# Patient Record
Sex: FEMALE | Race: BLACK OR AFRICAN AMERICAN | NOT HISPANIC OR LATINO | Employment: OTHER | URBAN - METROPOLITAN AREA
[De-identification: names, ages, dates, MRNs, and addresses within clinical notes are randomized per-mention and may not be internally consistent; named-entity substitution may affect disease eponyms.]

---

## 2017-09-25 ENCOUNTER — CONVERSION ENCOUNTER (OUTPATIENT)
Dept: MAMMOGRAPHY | Facility: HOSPITAL | Age: 82
End: 2017-09-25

## 2018-01-18 ENCOUNTER — OFFICE VISIT CONVERTED (OUTPATIENT)
Dept: ONCOLOGY | Facility: HOSPITAL | Age: 83
End: 2018-01-18
Attending: INTERNAL MEDICINE

## 2018-01-25 ENCOUNTER — OFFICE VISIT CONVERTED (OUTPATIENT)
Dept: SURGERY | Facility: CLINIC | Age: 83
End: 2018-01-25
Attending: PHYSICIAN ASSISTANT

## 2018-02-12 ENCOUNTER — OFFICE VISIT CONVERTED (OUTPATIENT)
Dept: FAMILY MEDICINE CLINIC | Facility: CLINIC | Age: 83
End: 2018-02-12
Attending: FAMILY MEDICINE

## 2018-04-25 ENCOUNTER — CONVERSION ENCOUNTER (OUTPATIENT)
Dept: ORTHOPEDIC SURGERY | Facility: CLINIC | Age: 83
End: 2018-04-25

## 2018-04-25 ENCOUNTER — OFFICE VISIT CONVERTED (OUTPATIENT)
Dept: ORTHOPEDIC SURGERY | Facility: CLINIC | Age: 83
End: 2018-04-25
Attending: PHYSICIAN ASSISTANT

## 2018-05-10 ENCOUNTER — CONVERSION ENCOUNTER (OUTPATIENT)
Dept: FAMILY MEDICINE CLINIC | Facility: CLINIC | Age: 83
End: 2018-05-10

## 2018-05-10 ENCOUNTER — OFFICE VISIT CONVERTED (OUTPATIENT)
Dept: FAMILY MEDICINE CLINIC | Facility: CLINIC | Age: 83
End: 2018-05-10
Attending: FAMILY MEDICINE

## 2018-05-18 ENCOUNTER — OFFICE VISIT CONVERTED (OUTPATIENT)
Dept: ONCOLOGY | Facility: HOSPITAL | Age: 83
End: 2018-05-18
Attending: INTERNAL MEDICINE

## 2018-08-01 ENCOUNTER — OFFICE VISIT CONVERTED (OUTPATIENT)
Dept: ORTHOPEDIC SURGERY | Facility: CLINIC | Age: 83
End: 2018-08-01
Attending: PHYSICIAN ASSISTANT

## 2018-08-16 ENCOUNTER — CONVERSION ENCOUNTER (OUTPATIENT)
Dept: FAMILY MEDICINE CLINIC | Facility: CLINIC | Age: 83
End: 2018-08-16

## 2018-08-16 ENCOUNTER — OFFICE VISIT CONVERTED (OUTPATIENT)
Dept: FAMILY MEDICINE CLINIC | Facility: CLINIC | Age: 83
End: 2018-08-16
Attending: FAMILY MEDICINE

## 2018-08-17 ENCOUNTER — OFFICE VISIT CONVERTED (OUTPATIENT)
Dept: ONCOLOGY | Facility: HOSPITAL | Age: 83
End: 2018-08-17
Attending: INTERNAL MEDICINE

## 2018-10-29 ENCOUNTER — CONVERSION ENCOUNTER (OUTPATIENT)
Dept: FAMILY MEDICINE CLINIC | Facility: CLINIC | Age: 83
End: 2018-10-29

## 2018-10-29 ENCOUNTER — OFFICE VISIT CONVERTED (OUTPATIENT)
Dept: FAMILY MEDICINE CLINIC | Facility: CLINIC | Age: 83
End: 2018-10-29
Attending: FAMILY MEDICINE

## 2018-11-07 ENCOUNTER — OFFICE VISIT CONVERTED (OUTPATIENT)
Dept: FAMILY MEDICINE CLINIC | Facility: CLINIC | Age: 83
End: 2018-11-07
Attending: FAMILY MEDICINE

## 2019-01-17 ENCOUNTER — CONVERSION ENCOUNTER (OUTPATIENT)
Dept: FAMILY MEDICINE CLINIC | Facility: CLINIC | Age: 84
End: 2019-01-17

## 2019-01-17 ENCOUNTER — OFFICE VISIT CONVERTED (OUTPATIENT)
Dept: FAMILY MEDICINE CLINIC | Facility: CLINIC | Age: 84
End: 2019-01-17
Attending: FAMILY MEDICINE

## 2019-01-17 ENCOUNTER — HOSPITAL ENCOUNTER (OUTPATIENT)
Dept: FAMILY MEDICINE CLINIC | Facility: CLINIC | Age: 84
Discharge: HOME | End: 2019-01-17
Attending: FAMILY MEDICINE

## 2019-01-17 LAB
ALBUMIN SERPL-MCNC: 4 G/DL (ref 3.5–5)
ALBUMIN/GLOB SERPL: 1 {RATIO} (ref 1.4–2.6)
ALP SERPL-CCNC: 84 U/L (ref 38–185)
ALT SERPL-CCNC: 10 U/L (ref 10–40)
ANION GAP SERPL CALC-SCNC: 20 MMOL/L (ref 8–19)
AST SERPL-CCNC: 25 U/L (ref 15–50)
BILIRUB SERPL-MCNC: 0.45 MG/DL (ref 0.2–1.3)
BUN SERPL-MCNC: 15 MG/DL (ref 5–25)
BUN/CREAT SERPL: 13 {RATIO} (ref 6–20)
CALCIUM SERPL-MCNC: 10.1 MG/DL (ref 8.7–10.4)
CHLORIDE SERPL-SCNC: 104 MMOL/L (ref 99–111)
CONV CO2: 23 MMOL/L (ref 22–32)
CONV TOTAL PROTEIN: 8.2 G/DL (ref 6.3–8.2)
CREAT UR-MCNC: 1.14 MG/DL (ref 0.5–0.9)
GFR SERPLBLD BASED ON 1.73 SQ M-ARVRAT: 48 ML/MIN/{1.73_M2}
GLOBULIN UR ELPH-MCNC: 4.2 G/DL (ref 2–3.5)
GLUCOSE SERPL-MCNC: 86 MG/DL (ref 65–99)
OSMOLALITY SERPL CALC.SUM OF ELEC: 294 MOSM/KG (ref 273–304)
POTASSIUM SERPL-SCNC: 4.9 MMOL/L (ref 3.5–5.3)
SODIUM SERPL-SCNC: 142 MMOL/L (ref 135–147)
TROPONIN T SERPL-MCNC: <0.01 NG/ML (ref 0–0.1)
TSH SERPL-ACNC: 0.55 M[IU]/L (ref 0.27–4.2)

## 2019-01-19 LAB — BACTERIA UR CULT: NORMAL

## 2019-01-28 ENCOUNTER — HOSPITAL ENCOUNTER (OUTPATIENT)
Dept: OTHER | Facility: HOSPITAL | Age: 84
Discharge: HOME | End: 2019-01-28
Attending: FAMILY MEDICINE

## 2019-02-08 ENCOUNTER — OFFICE VISIT CONVERTED (OUTPATIENT)
Dept: ORTHOPEDIC SURGERY | Facility: CLINIC | Age: 84
End: 2019-02-08
Attending: ORTHOPAEDIC SURGERY

## 2019-02-28 ENCOUNTER — CONVERSION ENCOUNTER (OUTPATIENT)
Dept: FAMILY MEDICINE CLINIC | Facility: CLINIC | Age: 84
End: 2019-02-28

## 2019-02-28 ENCOUNTER — OFFICE VISIT CONVERTED (OUTPATIENT)
Dept: FAMILY MEDICINE CLINIC | Facility: CLINIC | Age: 84
End: 2019-02-28
Attending: FAMILY MEDICINE

## 2019-02-28 ENCOUNTER — HOSPITAL ENCOUNTER (OUTPATIENT)
Dept: FAMILY MEDICINE CLINIC | Facility: CLINIC | Age: 84
Discharge: HOME | End: 2019-02-28
Attending: FAMILY MEDICINE

## 2019-02-28 LAB
ALBUMIN SERPL-MCNC: 4 G/DL (ref 3.5–5)
ALBUMIN/GLOB SERPL: 1 {RATIO} (ref 1.4–2.6)
ALP SERPL-CCNC: 92 U/L (ref 38–185)
ALT SERPL-CCNC: 16 U/L (ref 10–40)
ANION GAP SERPL CALC-SCNC: 18 MMOL/L (ref 8–19)
AST SERPL-CCNC: 19 U/L (ref 15–50)
BILIRUB SERPL-MCNC: 0.52 MG/DL (ref 0.2–1.3)
BUN SERPL-MCNC: 17 MG/DL (ref 5–25)
BUN/CREAT SERPL: 15 {RATIO} (ref 6–20)
CALCIUM SERPL-MCNC: 9.8 MG/DL (ref 8.7–10.4)
CHLORIDE SERPL-SCNC: 105 MMOL/L (ref 99–111)
CHOLEST SERPL-MCNC: 168 MG/DL (ref 107–200)
CHOLEST/HDLC SERPL: 2.2 {RATIO} (ref 3–6)
CONV CO2: 23 MMOL/L (ref 22–32)
CONV TOTAL PROTEIN: 7.9 G/DL (ref 6.3–8.2)
CREAT UR-MCNC: 1.13 MG/DL (ref 0.5–0.9)
GFR SERPLBLD BASED ON 1.73 SQ M-ARVRAT: 48 ML/MIN/{1.73_M2}
GLOBULIN UR ELPH-MCNC: 3.9 G/DL (ref 2–3.5)
GLUCOSE SERPL-MCNC: 76 MG/DL (ref 65–99)
HDLC SERPL-MCNC: 78 MG/DL (ref 40–60)
LDLC SERPL CALC-MCNC: 74 MG/DL (ref 70–100)
OSMOLALITY SERPL CALC.SUM OF ELEC: 294 MOSM/KG (ref 273–304)
POTASSIUM SERPL-SCNC: 3.8 MMOL/L (ref 3.5–5.3)
SODIUM SERPL-SCNC: 142 MMOL/L (ref 135–147)
TRIGL SERPL-MCNC: 79 MG/DL (ref 40–150)
VLDLC SERPL-MCNC: 16 MG/DL (ref 5–37)

## 2019-05-16 ENCOUNTER — OFFICE VISIT CONVERTED (OUTPATIENT)
Dept: FAMILY MEDICINE CLINIC | Facility: CLINIC | Age: 84
End: 2019-05-16
Attending: FAMILY MEDICINE

## 2019-05-16 ENCOUNTER — HOSPITAL ENCOUNTER (OUTPATIENT)
Dept: FAMILY MEDICINE CLINIC | Facility: CLINIC | Age: 84
Discharge: HOME | End: 2019-05-16
Attending: FAMILY MEDICINE

## 2019-05-16 LAB
ALBUMIN SERPL-MCNC: 4.2 G/DL (ref 3.5–5)
ALBUMIN/GLOB SERPL: 1.2 {RATIO} (ref 1.4–2.6)
ALP SERPL-CCNC: 81 U/L (ref 38–185)
ALT SERPL-CCNC: 8 U/L (ref 10–40)
ANION GAP SERPL CALC-SCNC: 16 MMOL/L (ref 8–19)
AST SERPL-CCNC: 17 U/L (ref 15–50)
BILIRUB SERPL-MCNC: 0.4 MG/DL (ref 0.2–1.3)
BUN SERPL-MCNC: 12 MG/DL (ref 5–25)
BUN/CREAT SERPL: 14 {RATIO} (ref 6–20)
CALCIUM SERPL-MCNC: 9.7 MG/DL (ref 8.7–10.4)
CHLORIDE SERPL-SCNC: 104 MMOL/L (ref 99–111)
CONV CO2: 24 MMOL/L (ref 22–32)
CONV TOTAL PROTEIN: 7.6 G/DL (ref 6.3–8.2)
CREAT UR-MCNC: 0.88 MG/DL (ref 0.5–0.9)
GFR SERPLBLD BASED ON 1.73 SQ M-ARVRAT: >60 ML/MIN/{1.73_M2}
GLOBULIN UR ELPH-MCNC: 3.4 G/DL (ref 2–3.5)
GLUCOSE SERPL-MCNC: 91 MG/DL (ref 65–99)
OSMOLALITY SERPL CALC.SUM OF ELEC: 291 MOSM/KG (ref 273–304)
POTASSIUM SERPL-SCNC: 3.3 MMOL/L (ref 3.5–5.3)
SODIUM SERPL-SCNC: 141 MMOL/L (ref 135–147)

## 2019-05-29 ENCOUNTER — HOSPITAL ENCOUNTER (OUTPATIENT)
Dept: CARDIOLOGY | Facility: HOSPITAL | Age: 84
Discharge: HOME | End: 2019-05-29
Attending: FAMILY MEDICINE

## 2019-06-10 ENCOUNTER — OFFICE VISIT CONVERTED (OUTPATIENT)
Dept: FAMILY MEDICINE CLINIC | Facility: CLINIC | Age: 84
End: 2019-06-10
Attending: FAMILY MEDICINE

## 2019-07-23 ENCOUNTER — HOSPITAL ENCOUNTER (OUTPATIENT)
Dept: NEUROLOGY | Facility: HOSPITAL | Age: 84
Discharge: HOME | End: 2019-07-23
Attending: PSYCHIATRY & NEUROLOGY

## 2019-09-24 ENCOUNTER — HOSPITAL ENCOUNTER (OUTPATIENT)
Dept: FAMILY MEDICINE CLINIC | Facility: CLINIC | Age: 84
Discharge: HOME | End: 2019-09-24
Attending: FAMILY MEDICINE

## 2019-09-24 ENCOUNTER — OFFICE VISIT CONVERTED (OUTPATIENT)
Dept: FAMILY MEDICINE CLINIC | Facility: CLINIC | Age: 84
End: 2019-09-24
Attending: FAMILY MEDICINE

## 2019-09-24 LAB
ALBUMIN SERPL-MCNC: 4.4 G/DL (ref 3.5–5)
ALBUMIN/GLOB SERPL: 1.2 {RATIO} (ref 1.4–2.6)
ALP SERPL-CCNC: 90 U/L (ref 38–185)
ALT SERPL-CCNC: 7 U/L (ref 10–40)
ANION GAP SERPL CALC-SCNC: 22 MMOL/L (ref 8–19)
AST SERPL-CCNC: 16 U/L (ref 15–50)
BASOPHILS # BLD AUTO: 0.03 10*3/UL (ref 0–0.2)
BASOPHILS NFR BLD AUTO: 0.5 % (ref 0–3)
BILIRUB SERPL-MCNC: 0.37 MG/DL (ref 0.2–1.3)
BUN SERPL-MCNC: 17 MG/DL (ref 5–25)
BUN/CREAT SERPL: 14 {RATIO} (ref 6–20)
CALCIUM SERPL-MCNC: 10.2 MG/DL (ref 8.7–10.4)
CHLORIDE SERPL-SCNC: 103 MMOL/L (ref 99–111)
CHOLEST SERPL-MCNC: 207 MG/DL (ref 107–200)
CHOLEST/HDLC SERPL: 3.4 {RATIO} (ref 3–6)
CONV ABS IMM GRAN: 0.01 10*3/UL (ref 0–0.2)
CONV CO2: 21 MMOL/L (ref 22–32)
CONV IMMATURE GRAN: 0.2 % (ref 0–1.8)
CONV TOTAL PROTEIN: 8.2 G/DL (ref 6.3–8.2)
CREAT UR-MCNC: 1.21 MG/DL (ref 0.5–0.9)
DEPRECATED RDW RBC AUTO: 48.2 FL (ref 36.4–46.3)
EOSINOPHIL # BLD AUTO: 0.09 10*3/UL (ref 0–0.7)
EOSINOPHIL # BLD AUTO: 1.6 % (ref 0–7)
ERYTHROCYTE [DISTWIDTH] IN BLOOD BY AUTOMATED COUNT: 13.4 % (ref 11.7–14.4)
GFR SERPLBLD BASED ON 1.73 SQ M-ARVRAT: 44 ML/MIN/{1.73_M2}
GLOBULIN UR ELPH-MCNC: 3.8 G/DL (ref 2–3.5)
GLUCOSE SERPL-MCNC: 83 MG/DL (ref 65–99)
HCT VFR BLD AUTO: 35.6 % (ref 37–47)
HDLC SERPL-MCNC: 61 MG/DL (ref 40–60)
HGB BLD-MCNC: 10.9 G/DL (ref 12–16)
LDLC SERPL CALC-MCNC: 122 MG/DL (ref 70–100)
LYMPHOCYTES # BLD AUTO: 2.25 10*3/UL (ref 1–5)
LYMPHOCYTES NFR BLD AUTO: 40 % (ref 20–45)
MCH RBC QN AUTO: 29.9 PG (ref 27–31)
MCHC RBC AUTO-ENTMCNC: 30.6 G/DL (ref 33–37)
MCV RBC AUTO: 97.5 FL (ref 81–99)
MONOCYTES # BLD AUTO: 0.59 10*3/UL (ref 0.2–1.2)
MONOCYTES NFR BLD AUTO: 10.5 % (ref 3–10)
NEUTROPHILS # BLD AUTO: 2.66 10*3/UL (ref 2–8)
NEUTROPHILS NFR BLD AUTO: 47.2 % (ref 30–85)
NRBC CBCN: 0 % (ref 0–0.7)
OSMOLALITY SERPL CALC.SUM OF ELEC: 295 MOSM/KG (ref 273–304)
PLATELET # BLD AUTO: 189 10*3/UL (ref 130–400)
PMV BLD AUTO: 12.1 FL (ref 9.4–12.3)
POTASSIUM SERPL-SCNC: 4.4 MMOL/L (ref 3.5–5.3)
RBC # BLD AUTO: 3.65 10*6/UL (ref 4.2–5.4)
SODIUM SERPL-SCNC: 142 MMOL/L (ref 135–147)
T4 FREE SERPL-MCNC: 1 NG/DL (ref 0.9–1.8)
TRIGL SERPL-MCNC: 119 MG/DL (ref 40–150)
TSH SERPL-ACNC: 8.01 M[IU]/L (ref 0.27–4.2)
VLDLC SERPL-MCNC: 24 MG/DL (ref 5–37)
WBC # BLD AUTO: 5.63 10*3/UL (ref 4.8–10.8)

## 2019-10-16 ENCOUNTER — OFFICE VISIT CONVERTED (OUTPATIENT)
Dept: ORTHOPEDIC SURGERY | Facility: CLINIC | Age: 84
End: 2019-10-16
Attending: ORTHOPAEDIC SURGERY

## 2019-12-12 ENCOUNTER — CONVERSION ENCOUNTER (OUTPATIENT)
Dept: FAMILY MEDICINE CLINIC | Facility: CLINIC | Age: 84
End: 2019-12-12

## 2019-12-12 ENCOUNTER — OFFICE VISIT CONVERTED (OUTPATIENT)
Dept: FAMILY MEDICINE CLINIC | Facility: CLINIC | Age: 84
End: 2019-12-12
Attending: FAMILY MEDICINE

## 2019-12-12 ENCOUNTER — HOSPITAL ENCOUNTER (OUTPATIENT)
Dept: FAMILY MEDICINE CLINIC | Facility: CLINIC | Age: 84
Discharge: HOME | End: 2019-12-12
Attending: FAMILY MEDICINE

## 2019-12-12 LAB
BASOPHILS # BLD AUTO: 0.03 10*3/UL (ref 0–0.2)
BASOPHILS NFR BLD AUTO: 0.5 % (ref 0–3)
CONV ABS IMM GRAN: 0.02 10*3/UL (ref 0–0.2)
CONV IMMATURE GRAN: 0.3 % (ref 0–1.8)
DEPRECATED RDW RBC AUTO: 48.6 FL (ref 36.4–46.3)
EOSINOPHIL # BLD AUTO: 0.05 10*3/UL (ref 0–0.7)
EOSINOPHIL # BLD AUTO: 0.9 % (ref 0–7)
ERYTHROCYTE [DISTWIDTH] IN BLOOD BY AUTOMATED COUNT: 13.4 % (ref 11.7–14.4)
FOLATE SERPL-MCNC: 15.8 NG/ML (ref 4.8–20)
HCT VFR BLD AUTO: 34 % (ref 37–47)
HGB BLD-MCNC: 10.5 G/DL (ref 12–16)
IRON SATN MFR SERPL: 17 % (ref 20–55)
IRON SERPL-MCNC: 53 UG/DL (ref 60–170)
LYMPHOCYTES # BLD AUTO: 1.61 10*3/UL (ref 1–5)
LYMPHOCYTES NFR BLD AUTO: 27.4 % (ref 20–45)
MCH RBC QN AUTO: 30.6 PG (ref 27–31)
MCHC RBC AUTO-ENTMCNC: 30.9 G/DL (ref 33–37)
MCV RBC AUTO: 99.1 FL (ref 81–99)
MONOCYTES # BLD AUTO: 0.59 10*3/UL (ref 0.2–1.2)
MONOCYTES NFR BLD AUTO: 10.1 % (ref 3–10)
NEUTROPHILS # BLD AUTO: 3.57 10*3/UL (ref 2–8)
NEUTROPHILS NFR BLD AUTO: 60.8 % (ref 30–85)
NRBC CBCN: 0 % (ref 0–0.7)
PLATELET # BLD AUTO: 199 10*3/UL (ref 130–400)
PMV BLD AUTO: 12 FL (ref 9.4–12.3)
RBC # BLD AUTO: 3.43 10*6/UL (ref 4.2–5.4)
TIBC SERPL-MCNC: 307 UG/DL (ref 245–450)
TRANSFERRIN SERPL-MCNC: 215 MG/DL (ref 250–380)
VIT B12 SERPL-MCNC: 539 PG/ML (ref 211–911)
WBC # BLD AUTO: 5.87 10*3/UL (ref 4.8–10.8)

## 2019-12-13 LAB — BACTERIA UR CULT: NORMAL

## 2020-01-29 ENCOUNTER — OFFICE VISIT CONVERTED (OUTPATIENT)
Dept: ORTHOPEDIC SURGERY | Facility: CLINIC | Age: 85
End: 2020-01-29
Attending: PHYSICIAN ASSISTANT

## 2020-02-22 ENCOUNTER — OFFICE VISIT CONVERTED (OUTPATIENT)
Dept: FAMILY MEDICINE CLINIC | Facility: CLINIC | Age: 85
End: 2020-02-22
Attending: FAMILY MEDICINE

## 2020-02-22 ENCOUNTER — HOSPITAL ENCOUNTER (OUTPATIENT)
Dept: FAMILY MEDICINE CLINIC | Facility: CLINIC | Age: 85
Discharge: HOME | End: 2020-02-22
Attending: FAMILY MEDICINE

## 2020-02-22 LAB
ALBUMIN SERPL-MCNC: 4.3 G/DL (ref 3.5–5)
ALBUMIN/GLOB SERPL: 1.3 {RATIO} (ref 1.4–2.6)
ALP SERPL-CCNC: 73 U/L (ref 38–185)
ALT SERPL-CCNC: 11 U/L (ref 10–40)
ANION GAP SERPL CALC-SCNC: 16 MMOL/L (ref 8–19)
AST SERPL-CCNC: 16 U/L (ref 15–50)
BASOPHILS # BLD AUTO: 0.01 10*3/UL (ref 0–0.2)
BASOPHILS NFR BLD AUTO: 0.2 % (ref 0–3)
BILIRUB SERPL-MCNC: 0.31 MG/DL (ref 0.2–1.3)
BUN SERPL-MCNC: 26 MG/DL (ref 5–25)
BUN/CREAT SERPL: 25 {RATIO} (ref 6–20)
CALCIUM SERPL-MCNC: 9.8 MG/DL (ref 8.7–10.4)
CHLORIDE SERPL-SCNC: 105 MMOL/L (ref 99–111)
CHOLEST SERPL-MCNC: 201 MG/DL (ref 107–200)
CHOLEST/HDLC SERPL: 2.5 {RATIO} (ref 3–6)
CONV ABS IMM GRAN: 0.02 10*3/UL (ref 0–0.2)
CONV CO2: 24 MMOL/L (ref 22–32)
CONV IMMATURE GRAN: 0.3 % (ref 0–1.8)
CONV TOTAL PROTEIN: 7.7 G/DL (ref 6.3–8.2)
CREAT UR-MCNC: 1.03 MG/DL (ref 0.5–0.9)
DEPRECATED RDW RBC AUTO: 48.2 FL (ref 36.4–46.3)
EOSINOPHIL # BLD AUTO: 0.05 10*3/UL (ref 0–0.7)
EOSINOPHIL # BLD AUTO: 0.8 % (ref 0–7)
ERYTHROCYTE [DISTWIDTH] IN BLOOD BY AUTOMATED COUNT: 13.1 % (ref 11.7–14.4)
GFR SERPLBLD BASED ON 1.73 SQ M-ARVRAT: 54 ML/MIN/{1.73_M2}
GLOBULIN UR ELPH-MCNC: 3.4 G/DL (ref 2–3.5)
GLUCOSE SERPL-MCNC: 65 MG/DL (ref 65–99)
HCT VFR BLD AUTO: 37.6 % (ref 37–47)
HDLC SERPL-MCNC: 80 MG/DL (ref 40–60)
HGB BLD-MCNC: 11.5 G/DL (ref 12–16)
LDLC SERPL CALC-MCNC: 107 MG/DL (ref 70–100)
LYMPHOCYTES # BLD AUTO: 2.01 10*3/UL (ref 1–5)
LYMPHOCYTES NFR BLD AUTO: 32.8 % (ref 20–45)
MAGNESIUM SERPL-MCNC: 1.92 MG/DL (ref 1.6–2.3)
MCH RBC QN AUTO: 30.8 PG (ref 27–31)
MCHC RBC AUTO-ENTMCNC: 30.6 G/DL (ref 33–37)
MCV RBC AUTO: 100.8 FL (ref 81–99)
MONOCYTES # BLD AUTO: 0.55 10*3/UL (ref 0.2–1.2)
MONOCYTES NFR BLD AUTO: 9 % (ref 3–10)
NEUTROPHILS # BLD AUTO: 3.48 10*3/UL (ref 2–8)
NEUTROPHILS NFR BLD AUTO: 56.9 % (ref 30–85)
NRBC CBCN: 0 % (ref 0–0.7)
OSMOLALITY SERPL CALC.SUM OF ELEC: 295 MOSM/KG (ref 273–304)
PLATELET # BLD AUTO: 187 10*3/UL (ref 130–400)
PMV BLD AUTO: 11.3 FL (ref 9.4–12.3)
POTASSIUM SERPL-SCNC: 3.7 MMOL/L (ref 3.5–5.3)
RBC # BLD AUTO: 3.73 10*6/UL (ref 4.2–5.4)
SODIUM SERPL-SCNC: 141 MMOL/L (ref 135–147)
T4 FREE SERPL-MCNC: 1 NG/DL (ref 0.9–1.8)
TRIGL SERPL-MCNC: 69 MG/DL (ref 40–150)
TSH SERPL-ACNC: 11.42 M[IU]/L (ref 0.27–4.2)
VLDLC SERPL-MCNC: 14 MG/DL (ref 5–37)
WBC # BLD AUTO: 6.12 10*3/UL (ref 4.8–10.8)

## 2020-07-02 ENCOUNTER — HOSPITAL ENCOUNTER (OUTPATIENT)
Dept: FAMILY MEDICINE CLINIC | Facility: CLINIC | Age: 85
Discharge: HOME | End: 2020-07-02
Attending: FAMILY MEDICINE

## 2020-07-02 ENCOUNTER — OFFICE VISIT CONVERTED (OUTPATIENT)
Dept: FAMILY MEDICINE CLINIC | Facility: CLINIC | Age: 85
End: 2020-07-02
Attending: FAMILY MEDICINE

## 2020-07-02 LAB
ALBUMIN SERPL-MCNC: 4 G/DL (ref 3.5–5)
ALBUMIN/GLOB SERPL: 1.1 {RATIO} (ref 1.4–2.6)
ALP SERPL-CCNC: 75 U/L (ref 38–185)
ALT SERPL-CCNC: 9 U/L (ref 10–40)
ANION GAP SERPL CALC-SCNC: 17 MMOL/L (ref 8–19)
AST SERPL-CCNC: 16 U/L (ref 15–50)
BASOPHILS # BLD AUTO: 0.02 10*3/UL (ref 0–0.2)
BASOPHILS NFR BLD AUTO: 0.3 % (ref 0–3)
BILIRUB SERPL-MCNC: 0.31 MG/DL (ref 0.2–1.3)
BUN SERPL-MCNC: 23 MG/DL (ref 5–25)
BUN/CREAT SERPL: 19 {RATIO} (ref 6–20)
CALCIUM SERPL-MCNC: 10 MG/DL (ref 8.7–10.4)
CHLORIDE SERPL-SCNC: 103 MMOL/L (ref 99–111)
CHOLEST SERPL-MCNC: 213 MG/DL (ref 107–200)
CHOLEST/HDLC SERPL: 3.7 {RATIO} (ref 3–6)
CONV ABS IMM GRAN: 0.03 10*3/UL (ref 0–0.2)
CONV CO2: 22 MMOL/L (ref 22–32)
CONV IMMATURE GRAN: 0.5 % (ref 0–1.8)
CONV TOTAL PROTEIN: 7.8 G/DL (ref 6.3–8.2)
CREAT UR-MCNC: 1.2 MG/DL (ref 0.5–0.9)
DEPRECATED RDW RBC AUTO: 45.3 FL (ref 36.4–46.3)
EOSINOPHIL # BLD AUTO: 0.11 10*3/UL (ref 0–0.7)
EOSINOPHIL # BLD AUTO: 1.8 % (ref 0–7)
ERYTHROCYTE [DISTWIDTH] IN BLOOD BY AUTOMATED COUNT: 12.6 % (ref 11.7–14.4)
GFR SERPLBLD BASED ON 1.73 SQ M-ARVRAT: 44 ML/MIN/{1.73_M2}
GLOBULIN UR ELPH-MCNC: 3.8 G/DL (ref 2–3.5)
GLUCOSE SERPL-MCNC: 94 MG/DL (ref 65–99)
HCT VFR BLD AUTO: 36.2 % (ref 37–47)
HDLC SERPL-MCNC: 58 MG/DL (ref 40–60)
HGB BLD-MCNC: 11.2 G/DL (ref 12–16)
LDLC SERPL CALC-MCNC: 128 MG/DL (ref 70–100)
LYMPHOCYTES # BLD AUTO: 1.86 10*3/UL (ref 1–5)
LYMPHOCYTES NFR BLD AUTO: 30.2 % (ref 20–45)
MCH RBC QN AUTO: 30.7 PG (ref 27–31)
MCHC RBC AUTO-ENTMCNC: 30.9 G/DL (ref 33–37)
MCV RBC AUTO: 99.2 FL (ref 81–99)
MONOCYTES # BLD AUTO: 0.7 10*3/UL (ref 0.2–1.2)
MONOCYTES NFR BLD AUTO: 11.4 % (ref 3–10)
NEUTROPHILS # BLD AUTO: 3.44 10*3/UL (ref 2–8)
NEUTROPHILS NFR BLD AUTO: 55.8 % (ref 30–85)
NRBC CBCN: 0 % (ref 0–0.7)
OSMOLALITY SERPL CALC.SUM OF ELEC: 287 MOSM/KG (ref 273–304)
PLATELET # BLD AUTO: 180 10*3/UL (ref 130–400)
PMV BLD AUTO: 12.1 FL (ref 9.4–12.3)
POTASSIUM SERPL-SCNC: 4.9 MMOL/L (ref 3.5–5.3)
RBC # BLD AUTO: 3.65 10*6/UL (ref 4.2–5.4)
SODIUM SERPL-SCNC: 137 MMOL/L (ref 135–147)
T4 FREE SERPL-MCNC: 1.1 NG/DL (ref 0.9–1.8)
TRIGL SERPL-MCNC: 137 MG/DL (ref 40–150)
TSH SERPL-ACNC: 8.1 M[IU]/L (ref 0.27–4.2)
VLDLC SERPL-MCNC: 27 MG/DL (ref 5–37)
WBC # BLD AUTO: 6.16 10*3/UL (ref 4.8–10.8)

## 2020-07-04 LAB
AMOXICILLIN+CLAV SUSC ISLT: 4
AMPICILLIN SUSC ISLT: <=2
AMPICILLIN+SULBAC SUSC ISLT: <=2
BACTERIA UR CULT: ABNORMAL
CEFAZOLIN SUSC ISLT: <=4
CEFEPIME SUSC ISLT: <=1
CEFTAZIDIME SUSC ISLT: <=1
CEFTRIAXONE SUSC ISLT: <=1
CEFUROXIME ORAL SUSC ISLT: 4
CEFUROXIME PARENTER SUSC ISLT: 4
CIPROFLOXACIN SUSC ISLT: <=0.25
ERTAPENEM SUSC ISLT: <=0.5
GENTAMICIN SUSC ISLT: <=1
LEVOFLOXACIN SUSC ISLT: <=0.12
NITROFURANTOIN SUSC ISLT: <=16
TETRACYCLINE SUSC ISLT: <=1
TMP SMX SUSC ISLT: <=20
TOBRAMYCIN SUSC ISLT: <=1

## 2020-07-08 ENCOUNTER — OFFICE VISIT CONVERTED (OUTPATIENT)
Dept: ORTHOPEDIC SURGERY | Facility: CLINIC | Age: 85
End: 2020-07-08
Attending: PHYSICIAN ASSISTANT

## 2020-07-08 ENCOUNTER — CONVERSION ENCOUNTER (OUTPATIENT)
Dept: ORTHOPEDIC SURGERY | Facility: CLINIC | Age: 85
End: 2020-07-08

## 2020-08-12 ENCOUNTER — HOSPITAL ENCOUNTER (OUTPATIENT)
Dept: OTHER | Facility: HOSPITAL | Age: 85
Discharge: HOME | End: 2020-08-12

## 2020-08-12 LAB
25(OH)D3 SERPL-MCNC: 18.9 NG/ML (ref 30–100)
ALBUMIN SERPL-MCNC: 3.2 G/DL (ref 3.5–5)
ALBUMIN/GLOB SERPL: 0.9 {RATIO} (ref 1.4–2.6)
ALP SERPL-CCNC: 67 U/L (ref 38–185)
ALT SERPL-CCNC: 7 U/L (ref 10–40)
ANION GAP SERPL CALC-SCNC: 14 MMOL/L (ref 8–19)
AST SERPL-CCNC: 18 U/L (ref 15–50)
BASOPHILS # BLD AUTO: 0.02 10*3/UL (ref 0–0.2)
BASOPHILS NFR BLD AUTO: 0.2 % (ref 0–3)
BILIRUB SERPL-MCNC: 0.31 MG/DL (ref 0.2–1.3)
BUN SERPL-MCNC: 19 MG/DL (ref 5–25)
BUN/CREAT SERPL: 14 {RATIO} (ref 6–20)
CALCIUM SERPL-MCNC: 10.3 MG/DL (ref 8.7–10.4)
CHLORIDE SERPL-SCNC: 101 MMOL/L (ref 99–111)
CONV ABS IMM GRAN: 0.02 10*3/UL (ref 0–0.2)
CONV CO2: 26 MMOL/L (ref 22–32)
CONV IMMATURE GRAN: 0.2 % (ref 0–1.8)
CONV TOTAL PROTEIN: 6.8 G/DL (ref 6.3–8.2)
CREAT UR-MCNC: 1.38 MG/DL (ref 0.5–0.9)
DEPRECATED RDW RBC AUTO: 47.3 FL (ref 36.4–46.3)
EOSINOPHIL # BLD AUTO: 0.12 10*3/UL (ref 0–0.7)
EOSINOPHIL # BLD AUTO: 1.4 % (ref 0–7)
ERYTHROCYTE [DISTWIDTH] IN BLOOD BY AUTOMATED COUNT: 12.7 % (ref 11.7–14.4)
GFR SERPLBLD BASED ON 1.73 SQ M-ARVRAT: 38 ML/MIN/{1.73_M2}
GLOBULIN UR ELPH-MCNC: 3.6 G/DL (ref 2–3.5)
GLUCOSE SERPL-MCNC: 113 MG/DL (ref 65–99)
HCT VFR BLD AUTO: 32.8 % (ref 37–47)
HGB BLD-MCNC: 10.2 G/DL (ref 12–16)
LYMPHOCYTES # BLD AUTO: 2.32 10*3/UL (ref 1–5)
LYMPHOCYTES NFR BLD AUTO: 27.2 % (ref 20–45)
MCH RBC QN AUTO: 31.3 PG (ref 27–31)
MCHC RBC AUTO-ENTMCNC: 31.1 G/DL (ref 33–37)
MCV RBC AUTO: 100.6 FL (ref 81–99)
MONOCYTES # BLD AUTO: 1.25 10*3/UL (ref 0.2–1.2)
MONOCYTES NFR BLD AUTO: 14.6 % (ref 3–10)
NEUTROPHILS # BLD AUTO: 4.81 10*3/UL (ref 2–8)
NEUTROPHILS NFR BLD AUTO: 56.4 % (ref 30–85)
NRBC CBCN: 0 % (ref 0–0.7)
OSMOLALITY SERPL CALC.SUM OF ELEC: 285 MOSM/KG (ref 273–304)
PLATELET # BLD AUTO: 133 10*3/UL (ref 130–400)
PMV BLD AUTO: 12.4 FL (ref 9.4–12.3)
POTASSIUM SERPL-SCNC: 5.3 MMOL/L (ref 3.5–5.3)
RBC # BLD AUTO: 3.26 10*6/UL (ref 4.2–5.4)
SODIUM SERPL-SCNC: 136 MMOL/L (ref 135–147)
WBC # BLD AUTO: 8.54 10*3/UL (ref 4.8–10.8)

## 2020-08-19 ENCOUNTER — HOSPITAL ENCOUNTER (OUTPATIENT)
Dept: OTHER | Facility: HOSPITAL | Age: 85
Discharge: HOME | End: 2020-08-19

## 2020-08-19 LAB
ANION GAP SERPL CALC-SCNC: 20 MMOL/L (ref 8–19)
APPEARANCE UR: CLEAR
BASOPHILS # BLD AUTO: 0.05 10*3/UL (ref 0–0.2)
BASOPHILS NFR BLD AUTO: 0.4 % (ref 0–3)
BILIRUB UR QL: NEGATIVE
BUN SERPL-MCNC: 52 MG/DL (ref 5–25)
BUN/CREAT SERPL: 35 {RATIO} (ref 6–20)
CALCIUM SERPL-MCNC: 10.3 MG/DL (ref 8.7–10.4)
CHLORIDE SERPL-SCNC: 92 MMOL/L (ref 99–111)
COLOR UR: YELLOW
CONV ABS IMM GRAN: 0.11 10*3/UL (ref 0–0.2)
CONV CO2: 26 MMOL/L (ref 22–32)
CONV COLLECTION SOURCE (UA): NORMAL
CONV IMMATURE GRAN: 0.8 % (ref 0–1.8)
CONV UROBILINOGEN IN URINE BY AUTOMATED TEST STRIP: 0.2 {EHRLICHU}/DL (ref 0.1–1)
CREAT UR-MCNC: 1.48 MG/DL (ref 0.5–0.9)
DEPRECATED RDW RBC AUTO: 45.5 FL (ref 36.4–46.3)
EOSINOPHIL # BLD AUTO: 0.15 10*3/UL (ref 0–0.7)
EOSINOPHIL # BLD AUTO: 1.2 % (ref 0–7)
ERYTHROCYTE [DISTWIDTH] IN BLOOD BY AUTOMATED COUNT: 12.8 % (ref 11.7–14.4)
GFR SERPLBLD BASED ON 1.73 SQ M-ARVRAT: 34 ML/MIN/{1.73_M2}
GLUCOSE SERPL-MCNC: 114 MG/DL (ref 65–99)
GLUCOSE UR QL: NEGATIVE MG/DL
HCT VFR BLD AUTO: 29.7 % (ref 37–47)
HGB BLD-MCNC: 9.5 G/DL (ref 12–16)
HGB UR QL STRIP: NEGATIVE
KETONES UR QL STRIP: NEGATIVE MG/DL
LEUKOCYTE ESTERASE UR QL STRIP: NEGATIVE
LYMPHOCYTES # BLD AUTO: 1.25 10*3/UL (ref 1–5)
LYMPHOCYTES NFR BLD AUTO: 9.6 % (ref 20–45)
MCH RBC QN AUTO: 31 PG (ref 27–31)
MCHC RBC AUTO-ENTMCNC: 32 G/DL (ref 33–37)
MCV RBC AUTO: 97.1 FL (ref 81–99)
MONOCYTES # BLD AUTO: 1.75 10*3/UL (ref 0.2–1.2)
MONOCYTES NFR BLD AUTO: 13.4 % (ref 3–10)
NEUTROPHILS # BLD AUTO: 9.72 10*3/UL (ref 2–8)
NEUTROPHILS NFR BLD AUTO: 74.6 % (ref 30–85)
NITRITE UR QL STRIP: NEGATIVE
NRBC CBCN: 0 % (ref 0–0.7)
OSMOLALITY SERPL CALC.SUM OF ELEC: 291 MOSM/KG (ref 273–304)
PH UR STRIP.AUTO: 6 [PH] (ref 5–8)
PLATELET # BLD AUTO: 310 10*3/UL (ref 130–400)
PMV BLD AUTO: 11.1 FL (ref 9.4–12.3)
POTASSIUM SERPL-SCNC: 4.9 MMOL/L (ref 3.5–5.3)
PROT UR QL: NEGATIVE MG/DL
RBC # BLD AUTO: 3.06 10*6/UL (ref 4.2–5.4)
SODIUM SERPL-SCNC: 133 MMOL/L (ref 135–147)
SP GR UR: 1.01 (ref 1–1.03)
WBC # BLD AUTO: 13.03 10*3/UL (ref 4.8–10.8)

## 2020-08-20 ENCOUNTER — HOSPITAL ENCOUNTER (OUTPATIENT)
Dept: OTHER | Facility: HOSPITAL | Age: 85
Discharge: HOME | End: 2020-08-20

## 2020-08-20 LAB
BASOPHILS # BLD AUTO: 0.04 10*3/UL (ref 0–0.2)
BASOPHILS NFR BLD AUTO: 0.3 % (ref 0–3)
CONV ABS IMM GRAN: 0.18 10*3/UL (ref 0–0.2)
CONV IMMATURE GRAN: 1.3 % (ref 0–1.8)
DEPRECATED RDW RBC AUTO: 44.4 FL (ref 36.4–46.3)
EOSINOPHIL # BLD AUTO: 0.29 10*3/UL (ref 0–0.7)
EOSINOPHIL # BLD AUTO: 2 % (ref 0–7)
ERYTHROCYTE [DISTWIDTH] IN BLOOD BY AUTOMATED COUNT: 12.8 % (ref 11.7–14.4)
HCT VFR BLD AUTO: 25.8 % (ref 37–47)
HGB BLD-MCNC: 8.3 G/DL (ref 12–16)
LYMPHOCYTES # BLD AUTO: 1.81 10*3/UL (ref 1–5)
LYMPHOCYTES NFR BLD AUTO: 12.7 % (ref 20–45)
MCH RBC QN AUTO: 30.9 PG (ref 27–31)
MCHC RBC AUTO-ENTMCNC: 32.2 G/DL (ref 33–37)
MCV RBC AUTO: 95.9 FL (ref 81–99)
MONOCYTES # BLD AUTO: 1.88 10*3/UL (ref 0.2–1.2)
MONOCYTES NFR BLD AUTO: 13.2 % (ref 3–10)
NEUTROPHILS # BLD AUTO: 10.01 10*3/UL (ref 2–8)
NEUTROPHILS NFR BLD AUTO: 70.5 % (ref 30–85)
NRBC CBCN: 0 % (ref 0–0.7)
PLATELET # BLD AUTO: 347 10*3/UL (ref 130–400)
PMV BLD AUTO: 11 FL (ref 9.4–12.3)
RBC # BLD AUTO: 2.69 10*6/UL (ref 4.2–5.4)
SARS-COV-2 RNA SPEC QL NAA+PROBE: NOT DETECTED
WBC # BLD AUTO: 14.21 10*3/UL (ref 4.8–10.8)

## 2020-08-21 ENCOUNTER — HOSPITAL ENCOUNTER (OUTPATIENT)
Dept: OTHER | Facility: HOSPITAL | Age: 85
Discharge: HOME | End: 2020-08-21

## 2020-08-21 ENCOUNTER — OFFICE VISIT CONVERTED (OUTPATIENT)
Dept: ORTHOPEDIC SURGERY | Facility: CLINIC | Age: 85
End: 2020-08-21
Attending: PHYSICIAN ASSISTANT

## 2020-08-21 LAB
ANION GAP SERPL CALC-SCNC: 17 MMOL/L (ref 8–19)
BASOPHILS # BLD AUTO: 0.04 10*3/UL (ref 0–0.2)
BASOPHILS NFR BLD AUTO: 0.3 % (ref 0–3)
BUN SERPL-MCNC: 50 MG/DL (ref 5–25)
BUN/CREAT SERPL: 34 {RATIO} (ref 6–20)
CALCIUM SERPL-MCNC: 10 MG/DL (ref 8.7–10.4)
CHLORIDE SERPL-SCNC: 97 MMOL/L (ref 99–111)
CONV ABS IMM GRAN: 0.3 10*3/UL (ref 0–0.2)
CONV CO2: 26 MMOL/L (ref 22–32)
CONV IMMATURE GRAN: 2.4 % (ref 0–1.8)
CREAT UR-MCNC: 1.49 MG/DL (ref 0.5–0.9)
DEPRECATED RDW RBC AUTO: 44.7 FL (ref 36.4–46.3)
EOSINOPHIL # BLD AUTO: 0.43 10*3/UL (ref 0–0.7)
EOSINOPHIL # BLD AUTO: 3.5 % (ref 0–7)
ERYTHROCYTE [DISTWIDTH] IN BLOOD BY AUTOMATED COUNT: 12.7 % (ref 11.7–14.4)
GFR SERPLBLD BASED ON 1.73 SQ M-ARVRAT: 34 ML/MIN/{1.73_M2}
GLUCOSE SERPL-MCNC: 97 MG/DL (ref 65–99)
HCT VFR BLD AUTO: 25 % (ref 37–47)
HGB BLD-MCNC: 8 G/DL (ref 12–16)
LYMPHOCYTES # BLD AUTO: 2.13 10*3/UL (ref 1–5)
LYMPHOCYTES NFR BLD AUTO: 17.3 % (ref 20–45)
MAGNESIUM SERPL-MCNC: 2.14 MG/DL (ref 1.6–2.3)
MCH RBC QN AUTO: 31.1 PG (ref 27–31)
MCHC RBC AUTO-ENTMCNC: 32 G/DL (ref 33–37)
MCV RBC AUTO: 97.3 FL (ref 81–99)
MONOCYTES # BLD AUTO: 1.53 10*3/UL (ref 0.2–1.2)
MONOCYTES NFR BLD AUTO: 12.4 % (ref 3–10)
NEUTROPHILS # BLD AUTO: 7.91 10*3/UL (ref 2–8)
NEUTROPHILS NFR BLD AUTO: 64.1 % (ref 30–85)
NRBC CBCN: 0 % (ref 0–0.7)
OSMOLALITY SERPL CALC.SUM OF ELEC: 295 MOSM/KG (ref 273–304)
PLATELET # BLD AUTO: 332 10*3/UL (ref 130–400)
PMV BLD AUTO: 10.1 FL (ref 9.4–12.3)
POTASSIUM SERPL-SCNC: 4.4 MMOL/L (ref 3.5–5.3)
RBC # BLD AUTO: 2.57 10*6/UL (ref 4.2–5.4)
SODIUM SERPL-SCNC: 136 MMOL/L (ref 135–147)
WBC # BLD AUTO: 12.34 10*3/UL (ref 4.8–10.8)

## 2020-08-26 LAB — BACTERIA BLD CULT: NORMAL

## 2021-05-07 NOTE — PROGRESS NOTES
Progress Note      Patient Name: Patsy Vidal   Patient ID: [id number]   Sex: Female   YOB: 1925    Primary Care Provider: Otis Jimenez MD   Referring Provider: Otis Jimenez MD    Visit Date: December 12, 2019    Provider: Otis Jimenez MD   Location: [facility]   Location Address: [address] [place] [address] [place]  [address]   Location Phone: [phone]          Chief Complaint     Check urine       History Of Present Illness  Patsy Vidal is a 89 year old /Black female who presents for evaluation and treatment of:      pt is having incontinence- urine is clear    discussed labs  anemia       Past Medical History  Disease Name Date Onset Notes   Allergic rhinitis --  --    Edema --  --    Fracture, hip --  --    GERD (gastroesophageal reflux disease) --  --    Hypercholesterolemia --  --    Hypertension --  --    Hypothyroidism --  --    Urinary tract infection --  --    Weight Loss --  --          Past Surgical History  Procedure Name Date Notes   Hip Replacement --  --          Medication List  Name Date Started Instructions   aspirin 81 mg oral tablet,chewable  chew 1 tablet (81 mg) by oral route once daily   atenolol 25 mg oral tablet 09/24/2019 take 1 tablet (25 mg) by oral route once daily for 90 days   atorvastatin 20 mg oral tablet 09/24/2019 take 1 tablet (20 mg) by oral route once daily at bedtime for 90 days   Calcium 500 + D 500 mg(1,250mg) -200 unit oral tablet 09/24/2019 take 1 tablet by oral route 3 times a day for 90 days   cyclobenzaprine 5 mg oral tablet 09/24/2019 take 1 tablet by oral route 2 times a day as needed for 90 days pain   hydrochlorothiazide 12.5 mg oral tablet 09/24/2019 take 1 tablet (12.5 mg) by oral route once daily for 90 days   levothyroxine 137 mcg oral tablet 09/26/2019 take 1 tablet by oral route every other day for 90 days   meclizine 12.5 mg oral tablet 09/24/2019 take 1 tablet 3 times daily as needed for  vertigo   Mobic 7.5 mg oral tablet 09/24/2019 take 1 tablet (7.5 mg) by oral route once daily for 10 days   Myrbetriq 25 mg oral tablet extended release 24 hr 09/24/2019 take 1 tablet (25 mg) by oral route once daily swallowing whole with water. Do not crush, chew and/or divide. for 90 days   omeprazole 20 mg oral capsule,delayed release(DR/EC) 09/24/2019 take 1 capsule (20 mg) by oral route once daily before a meal for 90 days   Singulair 10 mg oral tablet 09/24/2019 take 1 tablet (10 mg) by oral route once daily in the evening for 30 days   Zyrtec 10 mg oral tablet 09/24/2019 take 1 tablet (10 mg) by oral route once daily for 90 days         Allergy List  Allergen Name Date Reaction Notes   NO KNOWN DRUG ALLERGIES --  --  --          Social History  Finding Status Start/Stop Quantity Notes   Tobacco Never --/-- --  --          Immunizations  NameDate Admin Mfg Trade Name Lot Number Route Inj VIS Given VIS Publication   Litprnqcm79/24/2019 [facility] FLUZONE-HIGH DOSE [id number] IM  09/24/2019    Comments: NDC#[id number]   Keyamccrh76/01/2018 [facility] FLUZONE-HIGH DOSE [id number]  LD 10/01/2018 08/07/2015   Comments: ndc 58207-165-80         Review of Systems  · Constitutional  o Admits  o : fatigue  o Denies  o : fever  · Cardiovascular  o Denies  o : chest pain, palpitations  · Respiratory  o Denies  o : shortness of breath, cough  · Gastrointestinal  o Denies  o : nausea, vomiting, diarrhea      Vitals  Date Time BP Position Site L\R Cuff Size HR RR TEMP (F) WT  HT  BMI kg/m2 BSA m2 O2 Sat        12/12/2019 10:00 /80 Sitting    72 - R  96.9 156lbs 16oz    97 %          Physical Examination  · Constitutional  o Appearance  o : well developed, well-nourished, in no acute distress  · Head and Face  o HEENT  o : Unremarkable  · Respiratory  o Respiratory Effort  o : breathing unlabored  o Auscultation of Lungs  o : clear to ascultation  · Cardiovascular  o Heart  o :   § Auscultation of Heart  § : regular rate and  rhythm  o Peripheral Vascular System  o :   § Extremities  § : no edema  · Gastrointestinal  o Abdomen  o : soft, non-tender, non-distended, + bowel sounds, no hepatosplenomegaly, no masses palpated  · Musculoskeletal  o General  o :   § General Musculoskeletal  § : No joint swelling or deformity., Muscle tone, strength, and development grossly normal.  · Neurologic  o Gait and Station  o :   § Gait Screening  § : normal gait  · Psychiatric  o Mood and Affect  o : mood normal, affect appropriate              Assessment  · Anemia     285.9/D64.9  · Urinary incontinence     788.30/R32  · Spastic bladder     596.89/N32.89      Plan  · Orders  o B12 Folate levels (B12FO) - 285.9/D64.9 - 12/12/2019  o Iron panel (iron, TIBC, transferrin saturation) (82185, 74858, 00123) - 285.9/D64.9 - 12/12/2019  o CBC with Auto Diff [facility] (55559) - 788.30/R32, 596.89/N32.89, 285.9/D64.9 - 12/12/2019  o IOP - Urinalysis without Microscopy (Clinitek) [facility] (86320) - - 12/12/2019   GLU NEG ISAIAS NEG KET NEG SG 1.020 BLO NEG PH 5.5 PRO NEG URO 0.2 NIT NEG LASHAWN NEG  o Urine culture (56034, 54154) - 788.30/R32 - 12/12/2019  o ACO-39: Current medications updated and reviewed () - - 12/12/2019  · Medications  o atenolol 25 mg oral tablet   SIG: take 1 tablet (25 mg) by oral route once daily for 90 days   DISP: (90) tablet with 1 refills  Adjusted on 12/12/2019     o levothyroxine 137 mcg oral tablet   SIG: take 1 tablet by oral route every other day for 90 days   DISP: (45) tablets with 1 refills  Adjusted on 12/12/2019     o Myrbetriq 25 mg oral tablet extended release 24 hr   SIG: take 1 tablet (25 mg) by oral route once daily swallowing whole with water. Do not crush, chew and/or divide. for 90 days   DISP: (90) tablets with 1 refills  Adjusted on 12/12/2019     o omeprazole 20 mg oral capsule,delayed release(DR/EC)   SIG: take 1 capsule (20 mg) by oral route once daily before a meal for 90 days   DISP: (90) capsule with 1 refills  Adjusted  on 12/12/2019     o Medications have been Reconciled  o Transition of Care or Provider Policy  · Instructions  o Patient was educated/instructed on their diagnosis, treatment and medications prior to discharge from the clinic today.            Electronically Signed by: Otis Jimenez MD -Author on December 12, 2019 10:26:01 AM

## 2021-05-07 NOTE — PROGRESS NOTES
Progress Note      Patient Name: Patsy Vidal   Patient ID: [id number]   Sex: Female   YOB: 1925    Primary Care Provider: Otis Jimenez MD   Referring Provider: Otis Jimenez MD    Visit Date: September 24, 2019    Provider: Otis Jimenez MD   Location: [facility] [place]   Location Address: [address] [place] [address]   [place]  [address]   Location Phone: [phone]          Chief Complaint     med refills on all medicines.  slight cough, intermittent.       History Of Present Illness  Patsy Vidal is a 89 year old /Black female who presents for evaluation and treatment of:      pt needs refills on meds  pt has had left shoulder pain x 2 month- no known injury- dec ROM- pt hit it on door while walking- she said that she did not fall  pt has had chronic cough intermittent x 1-2 months- pt thinks it may be more allergies- not for sure  hypothyroidism- unknown control- pt not having any fatigue  hyperlipidemia- unknown control  HTN- controlled on meds  pt tolerating meds well    xrays:NAD, no fxs       Past Medical History  Disease Name Date Onset Notes   Allergic rhinitis --  --    Edema --  --    Fracture, hip --  --    GERD (gastroesophageal reflux disease) --  --    Hypercholesterolemia --  --    Hypertension --  --    Hypothyroidism --  --    Urinary tract infection --  --    Weight Loss --  --          Past Surgical History  Procedure Name Date Notes   Hip Replacement --  --          Medication List  Name Date Started Instructions   aspirin 81 mg oral tablet,chewable  chew 1 tablet (81 mg) by oral route once daily   atenolol 25 mg oral tablet 09/24/2019 take 1 tablet (25 mg) by oral route once daily for 90 days   atorvastatin 20 mg oral tablet 09/24/2019 take 1 tablet (20 mg) by oral route once daily at bedtime for 90 days   Calcium 500 + D 500 mg(1,250mg) -200 unit oral tablet 09/24/2019 take 1 tablet by oral route 3 times a day for 90 days  "  cyclobenzaprine 5 mg oral tablet 09/24/2019 take 1 tablet by oral route 2 times a day as needed for 90 days pain   hydrochlorothiazide 12.5 mg oral tablet 09/24/2019 take 1 tablet (12.5 mg) by oral route once daily for 90 days   levothyroxine 125 mcg oral tablet 09/24/2019 take 1 tablet by oral route every other day for 90 days   meclizine 12.5 mg oral tablet 09/24/2019 take 1 tablet 3 times daily as needed for vertigo   Myrbetriq 25 mg oral tablet extended release 24 hr 09/24/2019 take 1 tablet (25 mg) by oral route once daily swallowing whole with water. Do not crush, chew and/or divide. for 90 days   omeprazole 20 mg oral capsule,delayed release(/EC) 09/24/2019 take 1 capsule (20 mg) by oral route once daily before a meal for 90 days   Zyrtec 10 mg oral tablet 09/24/2019 take 1 tablet (10 mg) by oral route once daily for 90 days         Allergy List  Allergen Name Date Reaction Notes   NO KNOWN DRUG ALLERGIES --  --  --          Social History  Finding Status Start/Stop Quantity Notes   Tobacco Never --/-- --  --          Immunizations  NameDate Admin Mfg Trade Name Lot Number Route Inj VIS Given VIS Publication   Gnqcfdnrq15/24/2019 [facility] FLUZONE-HIGH DOSE [id number]   09/24/2019    Comments: NDC#[id number]   Mnuhvtcuh29/01/2018 [facility] FLUZONE-HIGH DOSE [id number] [facility] 10/01/2018 08/07/2015   Comments: ndc 45557-457-92         Review of Systems  · Constitutional  o Denies  o : fatigue, fever  · Cardiovascular  o Denies  o : chest pain, palpitations  · Respiratory  o Denies  o : shortness of breath, cough  · Gastrointestinal  o Denies  o : nausea, vomiting, diarrhea  · Integument  o Denies  o : rash  · Musculoskeletal  o Admits  o : shoulder pain  · Psychiatric  o Denies  o : anxiety, depression      Vitals  Date Time BP Position Site L\R Cuff Size HR RR TEMP (F) WT  HT  BMI kg/m2 BSA m2 O2 Sat        09/24/2019 11:09 /64 Sitting    67 - R  98.5 154lbs 2oz 5'  4\" 26.46 1.78 98 %          Physical " Examination  · Constitutional  o Appearance  o : well developed, well-nourished, in no acute distress  · Head and Face  o HEENT  o : Unremarkable  · Respiratory  o Respiratory Effort  o : breathing unlabored  o Auscultation of Lungs  o : clear to ascultation  · Cardiovascular  o Heart  o :   § Auscultation of Heart  § : regular rate and rhythm  o Peripheral Vascular System  o :   § Extremities  § : no edema  · Gastrointestinal  o Abdomen  o : soft, non-tender, non-distended, + bowel sounds, no hepatosplenomegaly, no masses palpated  · Musculoskeletal  o General  o :   § General Musculoskeletal  § : No joint swelling or deformity., Muscle tone, strength, and development grossly normal. Left posterior shoulder muscle tenderness, dec ROM, stable, no redness, warmth, or bruising.  · Neurologic  o Gait and Station  o :   § Gait Screening  § : normal gait  · Psychiatric  o Mood and Affect  o : mood normal, affect appropriate          Assessment  · Cough     786.2/R05  · Essential hypertension     401.9/I10  · Hyperlipidemia     272.4/E78.5  · Hypothyroidism     244.9/E03.9  · Left shoulder pain     719.41/M25.512      Plan  · Orders  o CBC with Auto Diff [facility] (05531) - 401.9/I10 - 09/24/2019  o CMP [facility] (77271) - 401.9/I10 - 09/24/2019  o Lipid Panel [facility] (61314) - 401.9/I10 - 09/24/2019  o Thyroid Profile (THYII) - 244.9/E03.9 - 09/24/2019  o Immunization Admin Fee (Single) ([facility]) (58312) - - 09/24/2019  o ACO-39: Current medications updated and reviewed () - - 09/24/2019  o Fluzone High Dose Flu Vaccine (53515) - - 09/24/2019   Vaccine - Influenza; Dose: 0.5; Site: Right Upper Arm; Route: Intramuscular; Date: 09/24/2019 11:21:00; Exp: 03/26/2020; Lot: [id number]; Mfg: [employer]; TradeName: FLUZONE-HIGH DOSE; Administered By: Nidia Lagunas MA; Comment: NDC#[id number]  o Xray shoulder left [facility] ([id number]-KP) - 719.41/M25.512 - 09/24/2019  o Xray chest 2 views [facility] (85033) - 786.2/R05 -  09/24/2019  o ORTHOPEDIC CONSULTATION (ORTHO) - 719.41/M25.512 - 09/24/2019  · Medications  o Singulair 10 mg oral tablet   SIG: take 1 tablet (10 mg) by oral route once daily in the evening for 30 days   DISP: (30) tablets with 1 refills  Prescribed on 09/24/2019     o Mobic 7.5 mg oral tablet   SIG: take 1 tablet (7.5 mg) by oral route once daily for 10 days   DISP: (10) tablets with 0 refills  Prescribed on 09/24/2019     o atenolol 25 mg oral tablet   SIG: take 1 tablet (25 mg) by oral route once daily for 90 days   DISP: (90) tablet with 1 refills  Adjusted on 09/24/2019     o atorvastatin 20 mg oral tablet   SIG: take 1 tablet (20 mg) by oral route once daily at bedtime for 90 days   DISP: (90) tablet with 1 refills  Adjusted on 09/24/2019     o Calcium 500 + D 500 mg(1,250mg) -200 unit oral tablet   SIG: take 1 tablet by oral route 3 times a day for 90 days   DISP: (270) tablet with 1 refills  Adjusted on 09/24/2019     o cyclobenzaprine 5 mg oral tablet   SIG: take 1 tablet by oral route 2 times a day as needed for 90 days pain   DISP: (180) tablets with 1 refills  Adjusted on 09/24/2019     o hydrochlorothiazide 12.5 mg oral tablet   SIG: take 1 tablet (12.5 mg) by oral route once daily for 90 days   DISP: (90) tablets with 1 refills  Adjusted on 09/24/2019     o levothyroxine 125 mcg oral tablet   SIG: take 1 tablet by oral route every other day for 90 days   DISP: (45) tablets with 1 refills  Adjusted on 09/24/2019     o meclizine 12.5 mg oral tablet   SIG: take 1 tablet 3 times daily as needed for vertigo   DISP: (90) tablet with 1 refills  Adjusted on 09/24/2019     o Myrbetriq 25 mg oral tablet extended release 24 hr   SIG: take 1 tablet (25 mg) by oral route once daily swallowing whole with water. Do not crush, chew and/or divide. for 90 days   DISP: (90) tablets with 1 refills  Adjusted on 09/24/2019     o omeprazole 20 mg oral capsule,delayed release(DR/EC)   SIG: take 1 capsule (20 mg) by oral  route once daily before a meal for 90 days   DISP: (90) capsule with 1 refills  Adjusted on 09/24/2019     o Zyrtec 10 mg oral tablet   SIG: take 1 tablet (10 mg) by oral route once daily for 90 days   DISP: (90) tablet with 1 refills  Adjusted on 09/24/2019     o potassium chloride 10 mEq oral capsule, extended release   SIG: take 1 capsule (10 meq) by oral route once daily with food for 14 days   DISP: (14) capsules with 0 refills  Discontinued on 09/24/2019     o Medications have been Reconciled  o Transition of Care or Provider Policy  · Instructions  o Advised that cheeses and other sources of dairy fats, animal fats, fast food, and the extras (candy, pasteries, pies, doughnuts and cookies) all contain LDL raising nutrients. Advised to increase fruits, vegetables, whole grains, and to monitor portion sizes.   o Patient was educated/instructed on their diagnosis, treatment and medications prior to discharge from the clinic today.            Electronically Signed by: Otis Jimenez MD -Author on September 24, 2019 12:45:42 PM

## 2021-05-07 NOTE — PROGRESS NOTES
Progress Note      Patient Name: Patsy Vidal   Patient ID: [id number]   Sex: Female   YOB: 1925    Primary Care Provider: Otis Jimenez MD   Referring Provider: Otis Jimenez MD    Visit Date: Lauren 10, 2019    Provider: Otis Jimenez MD   Location: [facility] [place]   Location Address: [address] [place] [address]   [place]  [address]   Location Phone: [phone]          Chief Complaint     follow up from ER due to falling and hitting her head       History Of Present Illness  Patsy Vidal is a 89 year old /Black female who presents for evaluation and treatment of:      pt has no close family- pt has Sikh friend with her- pt is beginning to fall more at home- was recently seen in ER- cleared  pt has no dizziness today- she gets dizziness intermittently- this is what caused her most recent fall when she went to ER- pt attempting to get into assisted living- she does not want to go into a nursing facility at this time       Past Medical History  Disease Name Date Onset Notes   Allergic rhinitis --  --    Edema --  --    Fracture, hip --  --    GERD (gastroesophageal reflux disease) --  --    Hypercholesterolemia --  --    Hypertension --  --    Hypothyroidism --  --    Urinary tract infection --  --    Weight Loss --  --          Past Surgical History  Procedure Name Date Notes   Hip Replacement --  --          Medication List  Name Date Started Instructions   aspirin 81 mg oral tablet,chewable  chew 1 tablet (81 mg) by oral route once daily   atenolol 25 mg oral tablet 02/28/2019 take 1 tablet (25 mg) by oral route once daily for 90 days   atorvastatin 20 mg oral tablet 02/28/2019 take 1 tablet (20 mg) by oral route once daily at bedtime for 90 days   Calcium 500 + D 500 mg(1,250mg) -200 unit oral tablet 02/28/2019 take 1 tablet by oral route 3 times a day for 90 days   cyclobenzaprine 5 mg oral tablet 02/28/2019 take 1 tablet by oral route 2 times a day as  "needed for 90 days pain   hydrochlorothiazide 12.5 mg oral tablet 02/28/2019 take 1 tablet (12.5 mg) by oral route once daily for 90 days   levothyroxine 125 mcg oral tablet 02/28/2019 take 1 tablet by oral route every other day for 90 days   meclizine 12.5 mg oral tablet 02/28/2019 take 1 tablet 3 times daily as needed for vertigo   Myrbetriq 25 mg oral tablet extended release 24 hr 02/28/2019 take 1 tablet (25 mg) by oral route once daily swallowing whole with water. Do not crush, chew and/or divide. for 90 days   omeprazole 20 mg oral capsule,delayed release(DR/EC) 02/28/2019 take 1 capsule (20 mg) by oral route once daily before a meal for 90 days   potassium chloride 10 mEq oral capsule, extended release 05/16/2019 take 1 capsule (10 meq) by oral route once daily with food for 14 days   Zyrtec 10 mg oral tablet 02/28/2019 take 1 tablet (10 mg) by oral route once daily for 90 days         Allergy List  Allergen Name Date Reaction Notes   NO KNOWN DRUG ALLERGIES --  --  --          Social History  Finding Status Start/Stop Quantity Notes   Tobacco Never --/-- --  --          Immunizations  NameDate Admin Mfg Trade Name Lot Number Route Inj VIS Given VIS Publication   Lhkjptcsq82/01/2018 [facility] FLUZONE-HIGH DOSE [id number] LD 10/01/2018 08/07/2015   Comments: ndc 32859-766-77         Review of Systems  · Constitutional  o Denies  o : fatigue, fever  · Cardiovascular  o Denies  o : chest pain, palpitations  · Respiratory  o Denies  o : shortness of breath, cough  · Gastrointestinal  o Denies  o : nausea, vomiting, diarrhea  · Neurologic  o Admits  o : dizziness  o Denies  o : headaches      Vitals  Date Time BP Position Site L\R Cuff Size HR RR TEMP (F) WT  HT  BMI kg/m2 BSA m2 O2 Sat        06/10/2019 10:14 /74 Sitting    72 - R  98.9 155lbs 0oz 5'  4\" 26.61 1.78 98 %          Physical Examination  · Constitutional  o Appearance  o : well developed, well-nourished, in no acute " distress  · Eyes  o Conjunctivae  o : conjunctivae normal  o Pupils and Irises  o : pupils equal and round, pupils reactive to light bilaterally  · Respiratory  o Respiratory Effort  o : breathing unlabored  o Auscultation of Lungs  o : clear to ascultation  · Cardiovascular  o Heart  o :   § Auscultation of Heart  § : regular rate and rhythm  o Peripheral Vascular System  o :   § Extremities  § : no edema  · Gastrointestinal  o Abdomen  o : soft, non-tender, non-distended, + bowel sounds, no hepatosplenomegaly, no masses palpated, no CVA tenderness  · Musculoskeletal  o General  o :   § General Musculoskeletal  § : No joint swelling or deformity., Muscle tone, strength, and development grossly normal.  · Neurologic  o Gait and Station  o :   § Gait Screening  § : uses walker  · Psychiatric  o Mood and Affect  o : mood normal, affect appropriate          Assessment  · Dizziness     780.4/R42      Plan  · Orders  o IOP - Urinalysis without Microscopy (Clinitek) [facility] (68427) - 780.4/R42 - 06/10/2019   GLU NEG, ISAIAS NEG, KET NEG, SG 1.025, PH 5.5, PRO NEG, URO 0.2 E.U.D/L, NIT NEG, LASHAWN NEG   o Urine culture (52056, 06561) - 780.4/R42 - 06/10/2019  o ACO-39: Current medications updated and reviewed () - - 06/10/2019  · Medications  o Ensure oral liquid   SI ensure PO daily   DISP: (30) Bottle with 5 refills  Discontinued on 06/10/2019     · Instructions  o Patient was educated/instructed on their diagnosis, treatment and medications prior to discharge from the clinic today.            Electronically Signed by: Otis Jimenez MD -Author on 2019 05:05:18 PM

## 2021-05-07 NOTE — PROGRESS NOTES
Progress Note      Patient Name: Patsy Vidal   Patient ID: [id number]   Sex: Female   YOB: 1925    Primary Care Provider: Otis Jimenez MD   Referring Provider: Otis Jimenez MD    Visit Date: November 7, 2018    Provider: Carmelita Stevenson MD   Location: [facility]   Location Address: [address] [place] [address]  [id number]   Location Phone: [phone]          Chief Complaint     follow up from the ER, she was having visual disturbances       History Of Present Illness  Patsy Vidal is a 89 year old /Black female who presents for evaluation and treatment of:      She had what sounds like floaters when she went to the ER.  They have gone away but she has an appointment at the eye doctor this afternoon.    She feels weaker than normal.  She points to the R side of her leg.    She was unable to finish the Bactrim  She thinks she might have reacted to it.  She was being treated for a UTI.  She is handling the Myrbetriq  Both of her legs stay swollen  The R is worse than the L  She had a hip fracture on the L.  She doesn't take the water puill when she goes out of the house.  She will take it when she gets home. Her face is more swollen than her usual       Past Medical History  Disease Name Date Onset Notes   Allergic rhinitis --  --    Edema --  --    Fracture, hip --  --    GERD (gastroesophageal reflux disease) --  --    Hypercholesterolemia --  --    Hypertension --  --    Hypothyroidism --  --    Urinary tract infection --  --    Weight Loss --  --          Past Surgical History  Procedure Name Date Notes   Hip Replacement --  --          Medication List  Name Date Started Instructions   atenolol 25 mg oral tablet 09/13/2018 take 1 tablet (25 mg) by oral route once daily for 90 days   atorvastatin 20 mg oral tablet 09/13/2018 take 1 tablet (20 mg) by oral route once daily at bedtime for 90 days   Calcium 500 + D 500 mg(1,250mg) -200 unit  oral tablet 09/13/2018 take 1 tablet by oral route 3 times a day for 90 days   cyclobenzaprine 5 mg oral tablet 05/10/2018 take 1 tablet by oral route 2 times a day as needed for 10 days muscle cramps   diclofenac potassium 50 mg oral tablet 08/16/2018 take 1 tablet by oral route 2 times a day as needed for 7 days pain   hydrochlorothiazide 12.5 mg oral tablet 10/29/2018 take 1 tablet (12.5 mg) by oral route once daily for 30 days   levothyroxine 112 mcg oral tablet 09/13/2018 take 1 tablet (112 mcg) by oral route once daily for 90 days   meclizine 12.5 mg oral tablet 09/13/2018 take 1 tablet 3 times daily as needed for vertigo   Myrbetriq 25 mg oral tablet extended release 24 hr 10/29/2018 take 1 tablet (25 mg) by oral route once daily swallowing whole with water. Do not crush, chew and/or divide. for 30 days   omeprazole 20 mg oral capsule,delayed release(DR/EC) 09/13/2018 take 1 capsule (20 mg) by oral route once daily before a meal for 90 days   Zyrtec 10 mg oral tablet 09/13/2018 take 1 tablet (10 mg) by oral route once daily for 90 days         Allergy List  Allergen Name Date Reaction Notes   NO KNOWN DRUG ALLERGIES --  --  --          Social History  Finding Status Start/Stop Quantity Notes   Tobacco Never --/-- --  --          Immunizations  NameDate Admin Mfg Trade Name Lot Number Route Inj VIS Given VIS Publication   Ruvzuqptp55/01/2018 [facility] Fluzone High-Dose [id number] IM LD 10/01/2018 08/07/2015   Comments: ndc 21018-036-40         Vitals  Date Time BP Position Site L\R Cuff Size HR RR TEMP(F) WT  HT  BMI kg/m2 BSA m2 O2 Sat HC       11/07/2018 11:53 /84 Sitting    95 - R  97.9 167lbs 0oz    94 %           Physical Examination  · Constitutional  o Appearance  o : well developed, well-nourished,face is a little puffy  · Eyes  o Conjunctivae  o : conjunctivae normal  · Neck  o Inspection/Palpation  o : supple  o Thyroid  o : no thyromegaly  · Respiratory  o Respiratory Effort  o : breathing  unlabored  o Auscultation of Lungs  o : clear to ascultation  · Cardiovascular  o Heart  o :   § Auscultation of Heart  § : regular rate and rhythm There is a murmur at LLSB  o Peripheral Vascular System  o :   § Extremities  § : RLE >LLE but both have some edema  · Lymphatic  o Neck  o : no lymphadenopathy present  · Skin and Subcutaneous Tissue  o General Inspection  o : normal  · Neurologic  o Gait and Station  o :   § Gait Screening  § : walks with a walker  · Psychiatric  o Mood and Affect  o : mood normal, affect appropriate          Assessment  · Hypothyroidism     244.9/E03.9  · Urinary tract infection     599.0/N39.0      Plan  · Orders  o Urinalysis with Reflex Microscopy if abnormal ([facility]) (25495) - 599.0/N39.0 - 11/07/2018  o ACO-39: Current medications updated and reviewed () - - 11/07/2018  · Medications  o levothyroxine 125 mcg oral tablet   SIG: take 1 tablet by oral route every other day for 90 days   DISP: (45) tablets with 1 refills  Adjusted on 11/07/2018     · Instructions  o Patient was educated/instructed on their diagnosis, treatment and medications prior to discharge from the clinic today.  o They will ask the eye doctor if her dilated retinal exam is normal. If it is normal I should order an MRI of the brain.            Electronically Signed by: Carmelita Stevenson MD -Author on November 7, 2018 12:26:44 PM

## 2021-05-07 NOTE — PROGRESS NOTES
"   Progress Note      Patient Name: Patsy Vidal   Patient ID: [id number]   Sex: Female   YOB: 1925    Primary Care Provider: Otis Jimenez MD   Referring Provider: Otis Jimenez MD    Visit Date: January 17, 2019    Provider: Otis Jimenez MD   Location: [facility]   Location Address: [address] [place] [address]  [id number]   Location Phone: [phone]          Chief Complaint     legs swelling, \"weird feeling\"; pain in left lower abdomen intermittently, increased BM, intermittent pain in left shoulder.       History Of Present Illness  Patsy Vidal is a 89 year old /Black female who presents for evaluation and treatment of:      pt has had intermittent swelling in both lower legs for several weeks- pt last saw cardiology in December but legs not swelling then pt says- she will call cardiology back    pt has had left lower quadrant abdominal pain x 2 weeks    pt has had intermittent left shoulder pain x 5 months    EKG: no ST or T wave changes, NSR    will prescribe JOBST stocking       Past Medical History  Disease Name Date Onset Notes   Allergic rhinitis --  --    Edema --  --    Fracture, hip --  --    GERD (gastroesophageal reflux disease) --  --    Hypercholesterolemia --  --    Hypertension --  --    Hypothyroidism --  --    Urinary tract infection --  --    Weight Loss --  --          Past Surgical History  Procedure Name Date Notes   Hip Replacement --  --          Medication List  Name Date Started Instructions   atenolol 25 mg oral tablet 09/13/2018 take 1 tablet (25 mg) by oral route once daily for 90 days   atorvastatin 20 mg oral tablet 09/13/2018 take 1 tablet (20 mg) by oral route once daily at bedtime for 90 days   Calcium 500 + D 500 mg(1,250mg) -200 unit oral tablet 09/13/2018 take 1 tablet by oral route 3 times a day for 90 days   hydrochlorothiazide 12.5 mg oral tablet 10/29/2018 take 1 tablet (12.5 mg) by oral route once " "daily for 30 days   levothyroxine 125 mcg oral tablet 11/07/2018 take 1 tablet by oral route every other day for 90 days   meclizine 12.5 mg oral tablet 09/13/2018 take 1 tablet 3 times daily as needed for vertigo   Myrbetriq 25 mg oral tablet extended release 24 hr 10/29/2018 take 1 tablet (25 mg) by oral route once daily swallowing whole with water. Do not crush, chew and/or divide. for 30 days   omeprazole 20 mg oral capsule,delayed release(DR/EC) 09/13/2018 take 1 capsule (20 mg) by oral route once daily before a meal for 90 days   Zyrtec 10 mg oral tablet 09/13/2018 take 1 tablet (10 mg) by oral route once daily for 90 days         Allergy List  Allergen Name Date Reaction Notes   NO KNOWN DRUG ALLERGIES --  --  --          Social History  Finding Status Start/Stop Quantity Notes   Tobacco Never --/-- --  --          Immunizations  NameDate Admin Mfg Trade Name Lot Number Route Inj VIS Given VIS Publication   Rizzhxlap29/01/2018 [facility] Fluzone High-Dose [id number] IM LD 10/01/2018 08/07/2015   Comments: ndc [id number]         Review of Systems  · Constitutional  o Denies  o : fatigue, fever  · Cardiovascular  o Admits  o : swelling (feet, ankles, hands)  o Denies  o : chest pain, palpitations  · Respiratory  o Denies  o : shortness of breath, cough  · Gastrointestinal  o Denies  o : nausea, vomiting, diarrhea  · Musculoskeletal  o Admits  o : shoulder pain      Vitals  Date Time BP Position Site L\R Cuff Size HR RR TEMP(F) WT  HT  BMI kg/m2 BSA m2 O2 Sat        01/17/2019 10:25 /60 Sitting    73 - R  99.6 164lbs 0oz 5'  4\" 28.15 1.83 95 %           Physical Examination  · Constitutional  o Appearance  o : well developed, well-nourished, in no acute distress  · Respiratory  o Respiratory Effort  o : breathing unlabored  o Auscultation of Lungs  o : clear to ascultation  · Cardiovascular  o Heart  o :   § Auscultation of Heart  § : regular rate and rhythm  o Peripheral Vascular System  o : "   § Extremities  § : bilateral nonpitting edema of legs up to calves  · Gastrointestinal  o Abdomen  o : soft, non-tender, non-distended, + bowel sounds, no hepatosplenomegaly, no masses palpated, CVA tenderness  · Musculoskeletal  o General  o :   § General Musculoskeletal  § : No joint swelling or deformity., Muscle tone, strength, and development grossly normal. left shoulder anterior bursa tenderness, normal ROM stable  · Neurologic  o Gait and Station  o :   § Gait Screening  § : normal gait  · Psychiatric  o Mood and Affect  o : mood normal, affect appropriate          Assessment  · Hypothyroidism     244.9/E03.9  · Edema     782.3/R60.9  · LLQ abdominal pain     789.04/R10.32  · Left shoulder pain     719.41/M25.512      Plan  · Orders  o IOP - CBC (54871) - 789.04/R10.32 - 01/17/2019  o CMP [facility] (67910) - 244.9/E03.9, 782.3/R60.9, 789.04/R10.32, 719.41/M25.512 - 01/17/2019  o TSH [facility] (70894) - 244.9/E03.9, 782.3/R60.9, 789.04/R10.32, 719.41/M25.512 - 01/17/2019  o IOP - Urinalysis without Microscopy (Cl[facility]) [facility] (95949) - 789.04/R10.32 - 01/17/2019   GLU NEG, ISAIAS NEG, KET NEG, SG 1.015, BLO TRACE-LYSED, PH 6.0, PRO NEG, URO 0.2, NIT NEG, LASHAWN NEG  o Urine culture (61140, 42917) - 789.04/R10.32 - 01/17/2019  o ACO-39: Current medications updated and reviewed () - - 01/17/2019  o Troponin (67519) - 244.9/E03.9, 782.3/R60.9, 789.04/R10.32, 719.41/M25.512 - 01/17/2019  o EKG (29485) - 244.9/E03.9, 782.3/R60.9, 789.04/R10.32, 719.41/M25.512 - 01/17/2019  o ORTHOPEDIC CONSULTATION (ORTHO) - 719.41/M25.512 - 01/17/2019  o CT Abdomen and Pelvis with IV Contrast [facility]; suggest Oral Prep (74224) - 789.04/R10.32 - 01/17/2019  · Instructions  o Patient was educated/instructed on their diagnosis, treatment and medications prior to discharge from the clinic today.            Electronically Signed by: Otis Jimenez MD -Author on January 17, 2019 12:05:52 PM

## 2021-05-07 NOTE — PROGRESS NOTES
Progress Note      Patient Name: Patsy Vidal   Patient ID: [id number]   Sex: Female   YOB: 1925    Primary Care Provider: Otis Jimenez MD   Referring Provider: Otis Jimenez MD    Visit Date: August 16, 2018    Provider: Otis Jimenez MD   Location: [facility]   Location Address: [address] [place] [address]  [id number]   Location Phone: [phone]          Chief Complaint     strange pain every once in awhile in low back and low stomach for about 3 weeks, not change in bowels. But did have some diarrhea 2 weeks ago.       History Of Present Illness  Patsy Vidal is a 89 year old /Black female who presents for evaluation and treatment of:      pt has had right lower back pain and left lower abdominal pain x 2 weeks- intermittent- lasts for entire day- no modifying factors, no fever, no nausea, no vomiting, had diarrhea 2 weeks ago that has resolved- worse with eating    pt also has chronic left shoulder pain- no recent injury       Past Medical History  Disease Name Date Onset Notes   Allergic rhinitis --  --    Edema --  --    Fracture, hip --  --    GERD (gastroesophageal reflux disease) --  --    Hypercholesterolemia --  --    Hypertension --  --    Hypothyroidism --  --    Urinary tract infection --  --    Weight Loss --  --          Past Surgical History  Procedure Name Date Notes   Hip Replacement --  --          Medication List  Name Date Started Instructions   atenolol 25 mg oral tablet 05/10/2018 take 1 tablet (25 mg) by oral route once daily for 90 days   atorvastatin 20 mg oral tablet 05/10/2018 take 1 tablet (20 mg) by oral route once daily at bedtime for 90 days   Calcium 500 + D 500 mg(1,250mg) -200 unit oral tablet 05/10/2018 take 1 tablet by oral route 3 times a day for 90 days   cyclobenzaprine 5 mg oral tablet 05/10/2018 take 1 tablet by oral route 2 times a day as needed for 10 days muscle cramps   levothyroxine 112 mcg oral  tablet 05/10/2018 take 1 tablet (112 mcg) by oral route once daily for 90 days   meclizine 12.5 mg oral tablet 05/10/2018 take 1 tablet 3 times daily as needed for vertigo   omeprazole 20 mg oral capsule,delayed release(DR/EC) 05/10/2018 take 1 capsule (20 mg) by oral route once daily before a meal for 90 days   oxybutynin chloride 5 mg oral tablet extended release 24hr 05/10/2018 take 1 tablet (5 mg) by oral route once daily for 90 days   Zyrtec 10 mg oral tablet 05/10/2018 take 1 tablet (10 mg) by oral route once daily for 90 days         Allergy List  Allergen Name Date Reaction Notes   NO KNOWN DRUG ALLERGIES --  --  --          Social History  Finding Status Start/Stop Quantity Notes   Tobacco Never --/-- --  --          Review of Systems  · Constitutional  o Denies  o : fatigue, fever  · Cardiovascular  o Denies  o : chest pain, palpitations  · Respiratory  o Denies  o : shortness of breath, cough  · Gastrointestinal  o Admits  o : abdominal pain  o Denies  o : nausea, vomiting, diarrhea  · Genitourinary  o Denies  o : dysuria, urinary retention  · Musculoskeletal  o Admits  o : back pain      Vitals  Date Time BP Position Site L\R Cuff Size HR RR TEMP(F) WT  HT  BMI kg/m2 BSA m2 O2 Sat        08/16/2018 02:12 /78 Sitting    66 - R  98.4 165lbs 6oz    98 %           Physical Examination  · Constitutional  o Appearance  o : well developed, well-nourished, in no acute distress  · Eyes  o Conjunctivae  o : conjunctivae normal  · Neck  o Inspection/Palpation  o : supple  o Thyroid  o : no thyromegaly  · Respiratory  o Respiratory Effort  o : breathing unlabored  o Auscultation of Lungs  o : clear to ascultation  · Cardiovascular  o Heart  o :   § Auscultation of Heart  § : regular rate and rhythm  o Peripheral Vascular System  o :   § Extremities  § : no edema  · Lymphatic  o Neck  o : no lymphadenopathy present  · Musculoskeletal  o General  o :   § General Musculoskeletal  § : No joint swelling or  deformity., Muscle tone, strength, and development grossly normal.  · Skin and Subcutaneous Tissue  o General Inspection  o : no lesions present, no areas of discoloration, skin turgor normal, texture normal  · Neurologic  o Gait and Station  o :   § Gait Screening  § : normal gait  · Psychiatric  o Mood and Affect  o : mood normal, affect appropriate          Assessment  · Abdominal pain     789.00/R10.9  · Low back pain     724.2/M54.5  · Left shoulder pain     719.41/M25.512      Plan  · Orders  o CBC with Auto Diff [facility] (06497) - 724.2/M54.5, 789.00/R10.9, 719.41/M25.512 - 08/16/2018  o CMP [facility] (12560) - 724.2/M54.5, 789.00/R10.9, 719.41/M25.512 - 08/16/2018  o Urinalysis with Reflex Microscopy if abnormal ([facility]) (97200) - 724.2/M54.5, 789.00/R10.9, 719.41/M25.512 - 08/16/2018  o Urine culture (56263, 86273) - 724.2/M54.5, 789.00/R10.9, 719.41/M25.512 - 08/16/2018  o ACO-39: Current medications updated and reviewed () - - 08/16/2018  o Xray shoulder left [facility] ([id number]-AL) - 724.2/M54.5, 789.00/R10.9, 719.41/M25.512 - 08/16/2018  o Lumbar Spine AP and Lateral X-Ray [facility] (91839) - 724.2/M54.5, 789.00/R10.9, 719.41/M25.512 - 08/16/2018  o CT Abdomen and Pelvis with IV Contrast [facility]; suggest Oral Prep (11642) - 724.2/M54.5, 789.00/R10.9, 719.41/M25.512 - 08/16/2018  o PHYSICAL THERAPY CONSULTATION ([facility]) - 724.2/M54.5, 719.41/M25.512 - 08/16/2018  · Medications  o diclofenac potassium 50 mg oral tablet   SIG: take 1 tablet by oral route 2 times a day as needed for 7 days pain   DISP: (14) tablets with 0 refills  Prescribed on 08/16/2018     · Instructions  o Instructed to seek medical attention urgently for new or worsening symptoms.  o Patient was educated/instructed on their diagnosis, treatment and medications prior to discharge from the clinic today.            Electronically Signed by: Otis Jimenez MD -Author on August 16, 2018 02:34:14 PM

## 2021-05-07 NOTE — PROGRESS NOTES
Progress Note      Patient Name: Patsy Vidal   Patient ID: [id number]   Sex: Female   YOB: 1925    Primary Care Provider: Otis Jimenez MD   Referring Provider: Otis Jimenez MD    Visit Date: July 2, 2020    Provider: Otis Jimenez MD   Location: [facility]   Location Address: [address] [place] [address] [place]  [address]   Location Phone: [phone]          Chief Complaint     med refills, possible UTI       History Of Present Illness  Patsy Vidal is a 89 year old /Black female who presents for evaluation and treatment of:      need refills on meds  HTN- controlled on meds  hyperlipidemia- unknown control- need labs to assess  hypothyroidism- unknown control- need labs to assess  dysuria x 1 week- sudden onset- worsening symptoms    discussed fda black box warning for singulair that med, taken chronically, can cause higher risk of depression and SI- pt agreed to stop med       Past Medical History  Disease Name Date Onset Notes   Allergic rhinitis --  --    Edema --  --    Fracture, hip --  --    GERD (gastroesophageal reflux disease) --  --    Hypercholesterolemia --  --    Hypertension --  --    Hypothyroidism --  --    Urinary tract infection --  --    Weight Loss --  --          Past Surgical History  Procedure Name Date Notes   Hip Replacement --  --          Medication List  Name Date Started Instructions   aspirin 81 mg oral tablet,chewable  chew 1 tablet (81 mg) by oral route once daily   atenolol 25 mg oral tablet 12/12/2019 take 1 tablet (25 mg) by oral route once daily for 90 days   atorvastatin 20 mg oral tablet 09/24/2019 take 1 tablet (20 mg) by oral route once daily at bedtime for 90 days   Calcium 500 + D 500 mg(1,250mg) -200 unit oral tablet 09/24/2019 take 1 tablet by oral route 3 times a day for 90 days   cyclobenzaprine 5 mg oral tablet 02/22/2020 take 1 tablet by oral route 2 times a day as needed for 90 days pain    hydrochlorothiazide 12.5 mg oral tablet 09/24/2019 take 1 tablet (12.5 mg) by oral route once daily for 90 days   levothyroxine 137 mcg oral tablet 12/12/2019 take 1 tablet by oral route every other day for 90 days   meclizine 12.5 mg oral tablet 09/24/2019 take 1 tablet 3 times daily as needed for vertigo   Mobic 7.5 mg oral tablet 09/24/2019 take 1 tablet (7.5 mg) by oral route once daily for 10 days   Myrbetriq 25 mg oral tablet extended release 24 hr 12/12/2019 take 1 tablet (25 mg) by oral route once daily swallowing whole with water. Do not crush, chew and/or divide. for 90 days   omeprazole 20 mg oral capsule,delayed release(DR/EC) 12/12/2019 take 1 capsule (20 mg) by oral route once daily before a meal for 90 days   Singulair 10 mg oral tablet 09/24/2019 take 1 tablet (10 mg) by oral route once daily in the evening for 30 days   Zyrtec 10 mg oral tablet 09/24/2019 take 1 tablet (10 mg) by oral route once daily for 90 days         Allergy List  Allergen Name Date Reaction Notes   NO KNOWN DRUG ALLERGIES --  --  --        Allergies Reconciled  Social History  Finding Status Start/Stop Quantity Notes   Tobacco Never --/-- --  --          Immunizations  NameDate Admin Mfg Trade Name Lot Number Route Inj VIS Given VIS Publication   Yauhxynif96/24/2019 [facility] FLUZONE-HIGH DOSE [id number]   09/24/2019    Comments: NDC#[id number]   Uiczqqpeq84/01/2018 [facility] FLUZONE-HIGH DOSE [id number] [facility] 10/01/2018 08/07/2015   Comments: ndc [id number]         Review of Systems  · Constitutional  o Denies  o : fatigue, fever  · Cardiovascular  o Denies  o : chest pain, palpitations  · Respiratory  o Denies  o : shortness of breath, cough  · Gastrointestinal  o Denies  o : nausea, vomiting, diarrhea  · Genitourinary  o Admits  o : urgency, dysuria  o Denies  o : frequency, urinary retention, vaginal discharge  · Psychiatric  o Denies  o : anxiety, depression      Vitals  Date Time BP Position Site L\R Cuff Size HR RR TEMP (F) WT  HT   BMI kg/m2 BSA m2 O2 Sat        07/02/2020 04:17 /88 Sitting    75 - R  98.4 161lbs 5oz    96 %          Physical Examination  · Constitutional  o Appearance  o : well developed, well-nourished, in no acute distress  · Respiratory  o Respiratory Effort  o : breathing unlabored  o Auscultation of Lungs  o : clear to ascultation  · Cardiovascular  o Heart  o :   § Auscultation of Heart  § : regular rate and rhythm  o Peripheral Vascular System  o :   § Extremities  § : no edema  · Gastrointestinal  o Abdomen  o : soft, non-tender, non-distended, + bowel sounds, no hepatosplenomegaly, no masses palpated, no CVA tenderness.  · Musculoskeletal  o General  o :   § General Musculoskeletal  § : No joint swelling or deformity., Muscle tone, strength, and development grossly normal.  · Neurologic  o Gait and Station  o :   § Gait Screening  § : normal gait  · Psychiatric  o Mood and Affect  o : mood normal, affect appropriate          Assessment  · Essential hypertension     401.9/I10  · Hyperlipidemia     272.4/E78.5  · Hypothyroidism     244.9/E03.9  · UTI (urinary tract infection)     599.0/N39.0  · Chronic right hip pain       Pain in right hip     719.45/M25.551  Other chronic pain     719.45/G89.29      Plan  · Orders  o CBC with Auto Diff [facility] ([id number]) - 401.9/I10, 272.4/E78.5, 244.9/E03.9 - 07/02/2020  o CMP [facility] ([id number]) - 401.9/I10 - 07/02/2020  o Lipid Panel [facility] (18385) - 401.9/I10 - 07/02/2020  o Thyroid Profile (THYII) - 244.9/E03.9 - 07/02/2020  o IOP - Urinalysis without Microscopy (Clinitek) [facility] (69329) - 599.0/N39.0 - 07/02/2020   GLU neg, ISAIAS neg, KET neg, SG 1.025, BLO neg, pH 7.0, PRO neg, URO 0.2 E.U./dL, NIT neg, LASHAWN neg  o Urine culture ([id number], [id number]) - 599.0/N39.0 - 07/02/2020  o ACO-39: Current medications updated and reviewed () - - 07/02/2020  · Medications  o Macrobid 100 mg oral capsule   SIG: take 1 capsule (100 mg) by oral route every 12 hours with food for 7 days   DISP: (14) capsules  with 0 refills  Prescribed on 07/02/2020     o atenolol 25 mg oral tablet   SIG: take 1 tablet (25 mg) by oral route once daily for 90 days   DISP: (90) tablet with 1 refills  Adjusted on 07/02/2020     o atorvastatin 20 mg oral tablet   SIG: take 1 tablet (20 mg) by oral route once daily at bedtime for 90 days   DISP: (90) tablet with 1 refills  Adjusted on 07/02/2020     o Calcium 500 + D 500 mg(1,250mg) -200 unit oral tablet   SIG: take 1 tablet by oral route 3 times a day for 90 days   DISP: (270) tablet with 1 refills  Adjusted on 07/02/2020     o cyclobenzaprine 5 mg oral tablet   SIG: take 1 tablet by oral route 2 times a day as needed for 90 days pain   DISP: (180) tablets with 1 refills  Adjusted on 07/02/2020     o hydrochlorothiazide 12.5 mg oral tablet   SIG: take 1 tablet (12.5 mg) by oral route once daily for 90 days   DISP: (90) tablets with 1 refills  Adjusted on 07/02/2020     o levothyroxine 137 mcg oral tablet   SIG: take 1 tablet by oral route every other day for 90 days   DISP: (45) tablets with 1 refills  Adjusted on 07/02/2020     o meclizine 12.5 mg oral tablet   SIG: take 1 tablet 3 times daily as needed for vertigo   DISP: (90) tablet with 1 refills  Adjusted on 07/02/2020     o Myrbetriq 25 mg oral tablet extended release 24 hr   SIG: take 1 tablet (25 mg) by oral route once daily swallowing whole with water. Do not crush, chew and/or divide. for 90 days   DISP: (90) tablets with 1 refills  Adjusted on 07/02/2020     o omeprazole 20 mg oral capsule,delayed release(DR/EC)   SIG: take 1 capsule (20 mg) by oral route once daily before a meal for 90 days   DISP: (90) capsule with 1 refills  Adjusted on 07/02/2020     o Zyrtec 10 mg oral tablet   SIG: take 1 tablet (10 mg) by oral route once daily for 90 days   DISP: (90) tablet with 1 refills  Adjusted on 07/02/2020     o Mobic 7.5 mg oral tablet   SIG: take 1 tablet (7.5 mg) by oral route once daily for 10 days   DISP: (10) tablets with 0  refills  Discontinued on 07/02/2020     o Singulair 10 mg oral tablet   SIG: take 1 tablet (10 mg) by oral route once daily in the evening for 30 days   DISP: (30) tablets with 1 refills  Discontinued on 07/02/2020     o Medications have been Reconciled  o Transition of Care or Provider Policy  · Instructions  o Advised that cheeses and other sources of dairy fats, animal fats, fast food, and the extras (candy, pasteries, pies, doughnuts and cookies) all contain LDL raising nutrients. Advised to increase fruits, vegetables, whole grains, and to monitor portion sizes.   o Patient was educated/instructed on their diagnosis, treatment and medications prior to discharge from the clinic today.  o Gave script for wheelchair.            Electronically Signed by: Otis Jimenez MD -Author on July 2, 2020 04:49:19 PM

## 2021-05-07 NOTE — PROGRESS NOTES
Progress Note      Patient Name: Patsy Vidal   Patient ID: [id number]   Sex: Female   YOB: 1925    Primary Care Provider: Otsi Jimenez MD   Referring Provider: Otis Jimenez MD    Visit Date: February 12, 2018    Provider: Otis Jimenez MD   Location: [facility]   Location Address: [address] [place] [address]  [id number]   Location Phone: [phone]          Chief Complaint     refills and follow up       History Of Present Illness  Patsy Vidal is a 89 year old /Black female who presents for evaluation and treatment of:      pt needs refills on meds and labs  HTN- controlled on med  hyperlipidemia- unknown control  hypothyroidism- unknown control  pt refuses to have iron checked and refuses any further testing on anemia and refuses to see helatology any more- pt understands risk of anemia       Past Medical History  Disease Name Date Onset Notes   Allergic rhinitis --  --    Edema --  --    Fracture, hip --  --    GERD (gastroesophageal reflux disease) --  --    Hypercholesterolemia --  --    Hypertension --  --    Hypothyroidism --  --    Urinary tract infection --  --    Weight Loss --  --          Past Surgical History  Procedure Name Date Notes   Hip Replacement --  --          Medication List  Name Date Started Instructions   atenolol 25 mg oral tablet 11/30/2017 take 1 tablet (25 mg) by oral route once daily for 90 days   atorvastatin 20 mg oral tablet 11/30/2017 take 1 tablet (20 mg) by oral route once daily at bedtime for 90 days   Calcium 500 + D 500 mg(1,250mg) -200 unit oral tablet 11/30/2017 take 1 tablet by oral route 3 times a day for 90 days   levothyroxine 112 mcg oral tablet 11/30/2017 take 1 tablet (112 mcg) by oral route once daily for 90 days   meclizine 12.5 mg oral tablet 11/30/2017 take 1 tablet 3 times daily as needed for vertigo   omeprazole 20 mg oral capsule,delayed release(DR/EC) 11/30/2017 take 1 capsule (20 mg)  by oral route once daily before a meal for 90 days   oxybutynin chloride 5 mg oral tablet  take 1tablet daily   Zyrtec 10 mg oral tablet 11/30/2017 take 1 tablet (10 mg) by oral route once daily for 90 days         Allergy List  Allergen Name Date Reaction Notes   NO KNOWN DRUG ALLERGIES --  --  --          Social History  Finding Status Start/Stop Quantity Notes   Tobacco Never --/-- --  --          Review of Systems  · Constitutional  o Denies  o : fatigue, fever  · Cardiovascular  o Denies  o : chest pain, palpitations  · Respiratory  o Denies  o : shortness of breath, cough  · Gastrointestinal  o Denies  o : nausea, vomiting, diarrhea, blood in stools  · Psychiatric  o Denies  o : anxiety, depression      Vitals  Date Time BP Position Site L\R Cuff Size HR RR TEMP(F) WT  HT  BMI kg/m2 BSA m2 O2 Sat HC       02/12/2018 10:35 /70 Sitting    110 - R  99 164lbs 0oz    100 %           Physical Examination  · Constitutional  o Appearance  o : well developed, well-nourished, in no acute distress  · Neck  o Inspection/Palpation  o : supple  o Thyroid  o : no thyromegaly  · Respiratory  o Respiratory Effort  o : breathing unlabored  o Auscultation of Lungs  o : clear to ascultation  · Cardiovascular  o Heart  o :   § Auscultation of Heart  § : regular rate and rhythm  · Gastrointestinal  o Abdomen  o : soft, non-tender, non-distended, + bowel sounds, no hepatosplenomegaly, no masses palpated  · Musculoskeletal  o General  o :   § General Musculoskeletal  § : No joint swelling or deformity., Muscle tone, strength, and development grossly normal.  · Neurologic  o Mental Status Examination  o :   § Orientation  § : grossly oriented to person, place and time  o Gait and Station  o :   § Gait Screening  § : normal gait          Assessment  · Hyperlipidemia     272.4/E78.5  · Hypothyroidism     244.9/E03.9      Plan  · Orders  o CBC with Auto Diff [facility] ([id number]) - 272.4/E78.5, 244.9/E03.9 - 02/12/2018  o CMP [facility] (43246) -  272.4/E78.5, 244.9/E03.9 - 02/12/2018  o Free T4 (18141) - 272.4/E78.5, 244.9/E03.9 - 02/12/2018  o Lipid Panel [facility] (12772) - 272.4/E78.5, 244.9/E03.9 - 02/12/2018  o TSH [facility] ([id number]) - 272.4/E78.5, 244.9/E03.9 - 02/12/2018  o ACO-39: Current medications updated and reviewed () - - 02/12/2018  · Medications  o oxybutynin chloride 5 mg oral tablet   SIG: take 1tablet daily   DISP: (90) tablet with 1 refills  Prescribed on 02/12/2018     o atenolol 25 mg oral tablet   SIG: take 1 tablet (25 mg) by oral route once daily for 90 days   DISP: (90) tablet with 1 refills  Adjusted on 02/12/2018     o atorvastatin 20 mg oral tablet   SIG: take 1 tablet (20 mg) by oral route once daily at bedtime for 90 days   DISP: (90) tablet with 1 refills  Adjusted on 02/12/2018     o Calcium 500 + D 500 mg(1,250mg) -200 unit oral tablet   SIG: take 1 tablet by oral route 3 times a day for 90 days   DISP: (270) tablet with 1 refills  Adjusted on 02/12/2018     o levothyroxine 112 mcg oral tablet   SIG: take 1 tablet (112 mcg) by oral route once daily for 90 days   DISP: (90) tablet with 1 refills  Adjusted on 02/12/2018     o meclizine 12.5 mg oral tablet   SIG: take 1 tablet 3 times daily as needed for vertigo   DISP: (90) tablet with 1 refills  Adjusted on 02/12/2018     o omeprazole 20 mg oral capsule,delayed release(DR/EC)   SIG: take 1 capsule (20 mg) by oral route once daily before a meal for 90 days   DISP: (90) capsule with 1 refills  Adjusted on 02/12/2018     o Zyrtec 10 mg oral tablet   SIG: take 1 tablet (10 mg) by oral route once daily for 90 days   DISP: (90) tablet with 1 refills  Adjusted on 02/12/2018     · Instructions  o Advised that cheeses and other sources of dairy fats, animal fats, fast food, and the extras (candy, pasteries, pies, doughnuts and cookies) all contain LDL raising nutrients. Advised to increase fruits, vegetables, whole grains, and to monitor portion sizes.   o Patient was educated/instructed on  their diagnosis, treatment and medications prior to discharge from the clinic today.            Electronically Signed by: Otis Jimenez MD -Author on February 12, 2018 10:59:36 AM

## 2021-05-07 NOTE — PROGRESS NOTES
Progress Note      Patient Name: Patsy Vidal   Patient ID: [id number]   Sex: Female   YOB: 1925    Primary Care Provider: Otis Jimenez MD   Referring Provider: Otis Jimenez MD    Visit Date: May 10, 2018    Provider: Otis Jimenez MD   Location: [facility]   Location Address: [address] [place] [address]  [id number]   Location Phone: [phone]          Chief Complaint     fasting labs,  hands and left leg has started cramping really bad. almost called ambulance the other night.       History Of Present Illness  Patsy Vidal is a 89 year old /Black female who presents for evaluation and treatment of:      pt having muscle cramps in hands and legs x 1-2 weeks- pt say that she is drinking fluids  HTN- controlled on meds- BP stable  hyperlipidemia- controlled on meds- checked labs  hypothyroidism- unknown control       Past Medical History  Disease Name Date Onset Notes   Allergic rhinitis --  --    Edema --  --    Fracture, hip --  --    GERD (gastroesophageal reflux disease) --  --    Hypercholesterolemia --  --    Hypertension --  --    Hypothyroidism --  --    Urinary tract infection --  --    Weight Loss --  --          Past Surgical History  Procedure Name Date Notes   Hip Replacement --  --          Medication List  Name Date Started Instructions   atenolol 25 mg oral tablet 02/12/2018 take 1 tablet (25 mg) by oral route once daily for 90 days   atorvastatin 20 mg oral tablet 02/12/2018 take 1 tablet (20 mg) by oral route once daily at bedtime for 90 days   Calcium 500 + D 500 mg(1,250mg) -200 unit oral tablet 02/12/2018 take 1 tablet by oral route 3 times a day for 90 days   levothyroxine 112 mcg oral tablet 02/12/2018 take 1 tablet (112 mcg) by oral route once daily for 90 days   meclizine 12.5 mg oral tablet 02/12/2018 take 1 tablet 3 times daily as needed for vertigo   omeprazole 20 mg oral capsule,delayed release(/EC) 02/12/2018  take 1 capsule (20 mg) by oral route once daily before a meal for 90 days   oxybutynin chloride 5 mg oral tablet 02/12/2018 take 1tablet daily   Zyrtec 10 mg oral tablet 02/12/2018 take 1 tablet (10 mg) by oral route once daily for 90 days         Allergy List  Allergen Name Date Reaction Notes   NO KNOWN DRUG ALLERGIES --  --  --          Social History  Finding Status Start/Stop Quantity Notes   Tobacco Never --/-- --  --          Review of Systems  · Constitutional  o Denies  o : fatigue, fever  · Cardiovascular  o Denies  o : chest pain, palpitations  · Respiratory  o Denies  o : shortness of breath, cough  · Gastrointestinal  o Denies  o : nausea, vomiting, diarrhea  · Musculoskeletal  o Admits  o : muscle pain  · Psychiatric  o Denies  o : anxiety, depression      Vitals  Date Time BP Position Site L\R Cuff Size HR RR TEMP(F) WT  HT  BMI kg/m2 BSA m2 O2 Sat HC       05/10/2018 10:01 /60 Sitting    95 - R  99.1 165lbs 0oz    96 %           Physical Examination  · Constitutional  o Appearance  o : well developed, well-nourished, in no acute distress  · Eyes  o Conjunctivae  o : conjunctivae normal  · Neck  o Inspection/Palpation  o : supple  o Thyroid  o : no thyromegaly  · Respiratory  o Respiratory Effort  o : breathing unlabored  o Auscultation of Lungs  o : clear to ascultation  · Cardiovascular  o Heart  o :   § Auscultation of Heart  § : regular rate and rhythm  o Peripheral Vascular System  o :   § Extremities  § : no edema  · Lymphatic  o Neck  o : no lymphadenopathy present  · Musculoskeletal  o General  o :   § General Musculoskeletal  § : No joint swelling or deformity., Muscle tone, strength, and development grossly normal.  · Skin and Subcutaneous Tissue  o General Inspection  o : no lesions present, no areas of discoloration, skin turgor normal, texture normal  · Neurologic  o Gait and Station  o :   § Gait Screening  § : normal gait  · Psychiatric  o Mood and Affect  o : mood normal, affect  appropriate          Assessment  · Essential hypertension     401.9/I10  · Hyperlipidemia     272.4/E78.5  · Hypothyroidism     244.9/E03.9  · Muscle cramps     729.82/R25.2      Plan  · Orders  o CBC with Auto Diff [facility] (62157) - 729.82/R25.2, 401.9/I10, 272.4/E78.5 - 05/10/2018  o CMP [facility] (24912) - 729.82/R25.2, 401.9/I10, 272.4/E78.5 - 05/10/2018  o Free T4 (65350) - 244.9/E03.9 - 05/10/2018  o TSH [facility] (67961) - 244.9/E03.9 - 05/10/2018  o ACO-39: Current medications updated and reviewed () - - 05/10/2018  o Magnesium level (08251) - 729.82/R25.2 - 05/10/2018  o CPK Total [facility] (94500) - 729.82/R25.2 - 05/10/2018  o Vitamin B-12 (51747) with Folate(66837) (80376\VF) - 729.82/R25.2 - 05/10/2018  · Medications  o cyclobenzaprine 5 mg oral tablet   SIG: take 1 tablet by oral route 2 times a day as needed for 10 days muscle cramps   DISP: (20) tablets with 0 refills  Prescribed on 05/10/2018     o atenolol 25 mg oral tablet   SIG: take 1 tablet (25 mg) by oral route once daily for 90 days   DISP: (90) tablet with 1 refills  Adjusted on 05/10/2018     o atorvastatin 20 mg oral tablet   SIG: take 1 tablet (20 mg) by oral route once daily at bedtime for 90 days   DISP: (90) tablet with 1 refills  Adjusted on 05/10/2018     o Calcium 500 + D 500 mg(1,250mg) -200 unit oral tablet   SIG: take 1 tablet by oral route 3 times a day for 90 days   DISP: (270) tablet with 1 refills  Adjusted on 05/10/2018     o levothyroxine 112 mcg oral tablet   SIG: take 1 tablet (112 mcg) by oral route once daily for 90 days   DISP: (90) tablet with 1 refills  Adjusted on 05/10/2018     o meclizine 12.5 mg oral tablet   SIG: take 1 tablet 3 times daily as needed for vertigo   DISP: (90) tablet with 1 refills  Adjusted on 05/10/2018     o omeprazole 20 mg oral capsule,delayed release(DR/EC)   SIG: take 1 capsule (20 mg) by oral route once daily before a meal for 90 days   DISP: (90) capsule with 1 refills  Adjusted on 05/10/2018      o oxybutynin chloride 5 mg oral tablet extended release 24hr   SIG: take 1 tablet (5 mg) by oral route once daily for 90 days   DISP: (90) tablets with 1 refills  Adjusted on 05/10/2018     o Zyrtec 10 mg oral tablet   SIG: take 1 tablet (10 mg) by oral route once daily for 90 days   DISP: (90) tablet with 1 refills  Adjusted on 05/10/2018     · Instructions  o Advised that cheeses and other sources of dairy fats, animal fats, fast food, and the extras (candy, pasteries, pies, doughnuts and cookies) all contain LDL raising nutrients. Advised to increase fruits, vegetables, whole grains, and to monitor portion sizes.   o Patient was educated/instructed on their diagnosis, treatment and medications prior to discharge from the clinic today.            Electronically Signed by: Otis Jimenez MD -Author on May 10, 2018 10:31:13 AM

## 2021-05-07 NOTE — PROGRESS NOTES
Progress Note      Patient Name: Patsy Vidal   Patient ID: [id number]   Sex: Female   YOB: 1925    Primary Care Provider: Otis Jimenez MD   Referring Provider: Otis Jimenez MD    Visit Date: October 29, 2018    Provider: Otis Jimenez MD   Location: [facility]   Location Address: [address] [place] [address]  [id number]   Location Phone: [phone]          Chief Complaint     PATIENT IS HERE FOR SOME SWELLING.  SHE HAS BEEN TAKING THE MEDICATION BUT SHE IS STILL HAVING SOME SWELLING ISSUES.  SHE HAS ALSO BEEN VERY DIZZY LATELY AND WAS WONDERING IF IT COULD BE ONE OF THE MEDICATIONS SHE IS ON.       History Of Present Illness  Patsy Vidal is a 89 year old /Black female who presents for evaluation and treatment of:      pt gets dizzy when taking oxybutynin- wants to try different class  pt has had generalized swelling of body x 2 weeks- sudden onset- worsening symptoms  hypothyroidism- unknown control- has fatigue  pt has dysuria       Past Medical History  Disease Name Date Onset Notes   Allergic rhinitis --  --    Edema --  --    Fracture, hip --  --    GERD (gastroesophageal reflux disease) --  --    Hypercholesterolemia --  --    Hypertension --  --    Hypothyroidism --  --    Urinary tract infection --  --    Weight Loss --  --          Past Surgical History  Procedure Name Date Notes   Hip Replacement --  --          Medication List  Name Date Started Instructions   atenolol 25 mg oral tablet 09/13/2018 take 1 tablet (25 mg) by oral route once daily for 90 days   atorvastatin 20 mg oral tablet 09/13/2018 take 1 tablet (20 mg) by oral route once daily at bedtime for 90 days   Calcium 500 + D 500 mg(1,250mg) -200 unit oral tablet 09/13/2018 take 1 tablet by oral route 3 times a day for 90 days   Cipro 500 mg oral tablet 08/21/2018 take 1 tablet (500 mg) by oral route every 12 hours for 7 days   cyclobenzaprine 5 mg oral tablet 05/10/2018  "take 1 tablet by oral route 2 times a day as needed for 10 days muscle cramps   diclofenac potassium 50 mg oral tablet 08/16/2018 take 1 tablet by oral route 2 times a day as needed for 7 days pain   levothyroxine 112 mcg oral tablet 09/13/2018 take 1 tablet (112 mcg) by oral route once daily for 90 days   Macrobid 100 mg oral capsule 08/17/2018 take 1 capsule (100 mg) by oral route every 12 hours with food for 7 days   meclizine 12.5 mg oral tablet 09/13/2018 take 1 tablet 3 times daily as needed for vertigo   omeprazole 20 mg oral capsule,delayed release(DR/EC) 09/13/2018 take 1 capsule (20 mg) by oral route once daily before a meal for 90 days   oxybutynin chloride 5 mg oral tablet extended release 24hr 09/13/2018 take 1 tablet (5 mg) by oral route once daily for 90 days   Zyrtec 10 mg oral tablet 09/13/2018 take 1 tablet (10 mg) by oral route once daily for 90 days         Allergy List  Allergen Name Date Reaction Notes   NO KNOWN DRUG ALLERGIES --  --  --          Social History  Finding Status Start/Stop Quantity Notes   Tobacco Never --/-- --  --          Immunizations  NameDate Admin Mfg Trade Name Lot Number Route Inj VIS Given VIS Publication   [id number]/01/2018 [facility] Fluzone High-Dose [id number] IM LD 10/01/2018 08/07/2015   Comments: ndc [id number]         Review of Systems  · Constitutional  o Admits  o : fatigue  o Denies  o : fever  · Cardiovascular  o Admits  o : swelling (feet, ankles, hands)  o Denies  o : chest pain, palpitations  · Respiratory  o Denies  o : shortness of breath, cough  · Neurologic  o Admits  o : dizziness      Vitals  Date Time BP Position Site L\R Cuff Size HR RR TEMP(F) WT  HT  BMI kg/m2 BSA m2 O2 Sat HC       10/29/2018 01:04 /60 Sitting    72 - R  98.2 172lbs 1oz 5'  4\" 29.53 1.88 98 %           Physical Examination  · Constitutional  o Appearance  o : well developed, well-nourished, in no acute distress  · Respiratory  o Respiratory Effort  o : breathing " unlabored  o Auscultation of Lungs  o : clear to ascultation  · Cardiovascular  o Heart  o :   § Auscultation of Heart  § : regular rate and rhythm  o Peripheral Vascular System  o :   § Extremities  § : 1+ pitting edema in bilateral lower extremities  · Musculoskeletal  o General  o :   § General Musculoskeletal  § : No joint swelling or deformity., Muscle tone, strength, and development grossly normal.  · Neurologic  o Gait and Station  o :   § Gait Screening  § : normal gait  · Psychiatric  o Mood and Affect  o : mood normal, affect appropriate          Assessment  · Fatigue     780.79/R53.83  · Hypothyroidism     244.9/E03.9  · Overactive bladder     596.51/N32.81  · Edema     782.3/R60.9  · Dysuria     788.1/R30.0  · UTI (urinary tract infection)     599.0/N39.0      Plan  · Orders  o B12 Folate levels (B12FO) - 780.79/R53.83 - 10/29/2018  o CBC with Auto Diff [facility] (39623) - 780.79/R53.83 - 10/29/2018  o CMP [facility] (87338) - 596.51/N32.81, 782.3/R60.9, 244.9/E03.9, 780.79/R53.83 - 10/29/2018  o Free T4 (12995) - 244.9/E03.9 - 10/29/2018  o TSH [facility] (08246) - 244.9/E03.9 - 10/29/2018  o IOP - Urinalysis without Microscopy (Clinitek) [facility] (04738) - 596.51/N32.81, 782.3/R60.9, 780.79/R53.83, 788.1/R30.0 - 10/29/2018   GLU NEG, ISAIAS NEG, KET NEG, SG 1.010, BLO NEG, PH 5.5, PRO NEG, URO 0.2, NIT NEG, LASHAWN SMALL  o Urine culture (91816, 77746) - 596.51/N32.81, 782.3/R60.9, 780.79/R53.83, 788.1/R30.0 - 10/29/2018  o ACO-39: Current medications updated and reviewed () - - 10/29/2018  · Medications  o Myrbetriq 25 mg oral tablet extended release 24 hr   SIG: take 1 tablet (25 mg) by oral route once daily swallowing whole with water. Do not crush, chew and/or divide. for 30 days   DISP: (30) tablets with 5 refills  Prescribed on 10/29/2018     o hydrochlorothiazide 12.5 mg oral tablet   SIG: take 1 tablet (12.5 mg) by oral route once daily for 30 days   DISP: (30) tablets with 1 refills  Prescribed on 10/29/2018      o Bactrim -160 mg oral tablet   SIG: take 1 tablet by oral route every 12 hours for 7 days   DISP: (14) tablets with 0 refills  Prescribed on 10/29/2018     o Cipro 500 mg oral tablet   SIG: take 1 tablet (500 mg) by oral route every 12 hours for 7 days   DISP: (14) tablets with 0 refills  Discontinued on 10/29/2018     o Macrobid 100 mg oral capsule   SIG: take 1 capsule (100 mg) by oral route every 12 hours with food for 7 days   DISP: (14) capsules with 0 refills  Discontinued on 10/29/2018     o oxybutynin chloride 5 mg oral tablet extended release 24hr   SIG: take 1 tablet (5 mg) by oral route once daily for 90 days   DISP: (90) tablets with 1 refills  Discontinued on 10/29/2018     · Instructions  o Take all medications as prescribed/directed.  o Patient was educated/instructed on their diagnosis, treatment and medications prior to discharge from the clinic today.            Electronically Signed by: Otis Jimenez MD -Author on November 1, 2018 04:46:37 PM

## 2021-05-07 NOTE — PROGRESS NOTES
Progress Note      Patient Name: Patsy Vidal   Patient ID: [id number]   Sex: Female   YOB: 1925    Primary Care Provider: Otis Jimenez MD   Referring Provider: Otis Jimenez MD    Visit Date: May 16, 2019    Provider: Otis Jimenez MD   Location: [facility]   Location Address: [address] [place] [address]  [id number]   Location Phone: [phone]          Chief Complaint     follow up from [place] ER visit friday.    thinks she has some sinus issues going on       History Of Present Illness  Patsy Vidal is a 89 year old /Black female who presents for evaluation and treatment of:      pt sees hematology for hemochromatosis and anemia and sees nephrology  pt recently seen in ER for likely TIA- ER had wanted to admit pt- pt refused    pt has intermittent headaches, and pt has palpitations intermittently- pt will be seeing cardiology soon    pt is taking baby aspirin daily  EKG: not showing any changes from January 2019       Past Medical History  Disease Name Date Onset Notes   Allergic rhinitis --  --    Edema --  --    Fracture, hip --  --    GERD (gastroesophageal reflux disease) --  --    Hypercholesterolemia --  --    Hypertension --  --    Hypothyroidism --  --    Urinary tract infection --  --    Weight Loss --  --          Past Surgical History  Procedure Name Date Notes   Hip Replacement --  --          Medication List  Name Date Started Instructions   aspirin 81 mg oral tablet,chewable  chew 1 tablet (81 mg) by oral route once daily   atenolol 25 mg oral tablet 02/28/2019 take 1 tablet (25 mg) by oral route once daily for 90 days   atorvastatin 20 mg oral tablet 02/28/2019 take 1 tablet (20 mg) by oral route once daily at bedtime for 90 days   Calcium 500 + D 500 mg(1,250mg) -200 unit oral tablet 02/28/2019 take 1 tablet by oral route 3 times a day for 90 days   cyclobenzaprine 5 mg oral tablet 02/28/2019 take 1 tablet by oral route 2 times  "a day as needed for 90 days pain   hydrochlorothiazide 12.5 mg oral tablet 02/28/2019 take 1 tablet (12.5 mg) by oral route once daily for 90 days   levothyroxine 125 mcg oral tablet 02/28/2019 take 1 tablet by oral route every other day for 90 days   meclizine 12.5 mg oral tablet 02/28/2019 take 1 tablet 3 times daily as needed for vertigo   Myrbetriq 25 mg oral tablet extended release 24 hr 02/28/2019 take 1 tablet (25 mg) by oral route once daily swallowing whole with water. Do not crush, chew and/or divide. for 90 days   omeprazole 20 mg oral capsule,delayed release(DR/EC) 02/28/2019 take 1 capsule (20 mg) by oral route once daily before a meal for 90 days   Zyrtec 10 mg oral tablet 02/28/2019 take 1 tablet (10 mg) by oral route once daily for 90 days         Allergy List  Allergen Name Date Reaction Notes   NO KNOWN DRUG ALLERGIES --  --  --          Social History  Finding Status Start/Stop Quantity Notes   Tobacco Never --/-- --  --          Immunizations  NameDate Admin Mfg Trade Name Lot Number Route Inj VIS Given VIS Publication   [id number]/01/2018 [facility] FLUZONE-HIGH DOSE [id number] IM LD 10/01/2018 08/07/2015   Comments: ndc [id number]         Review of Systems  · Constitutional  o Denies  o : fatigue, fever  · HENT  o Admits  o : sinus pain, nasal congestion, nasal discharge  o Denies  o : sore throat  · Cardiovascular  o Admits  o : palpitations  o Denies  o : chest pain  · Respiratory  o Denies  o : shortness of breath, cough  · Gastrointestinal  o Denies  o : nausea, vomiting, diarrhea  · Genitourinary  o Denies  o : dysuria, urinary retention  · Neurologic  o Admits  o : altered mental status, headaches  o Denies  o : dizziness      Vitals  Date Time BP Position Site L\R Cuff Size HR RR TEMP (F) WT  HT  BMI kg/m2 BSA m2 O2 Sat        05/16/2019 10:48 /62 Sitting    69 - R  99.9 164lbs 0oz 5'  4\" 28.15 1.83 98 %          Physical Examination  · Constitutional  o Appearance  o : well developed, " well-nourished, in no acute distress  · Head and Face  o HEENT  o : Bilateral frontal sinus tenderness, MMM, no erythema of throat  · Eyes  o Conjunctivae  o : conjunctivae normal  o Pupils and Irises  o : pupils equal and round, pupils reactive to light bilaterally  · Respiratory  o Respiratory Effort  o : breathing unlabored  o Auscultation of Lungs  o : clear to ascultation  · Cardiovascular  o Heart  o :   § Auscultation of Heart  § : regular rate and rhythm  o Peripheral Vascular System  o :   § Extremities  § : no edema  · Gastrointestinal  o Abdomen  o : soft, non-tender, non-distended, + bowel sounds, no hepatosplenomegaly, no masses palpated, no CVA tenderness  · Musculoskeletal  o General  o :   § General Musculoskeletal  § : No joint swelling or deformity., Muscle tone, strength, and development grossly normal.  · Neurologic  o Gait and Station  o :   § Gait Screening  § : normal gait  · Psychiatric  o Mood and Affect  o : mood normal, affect appropriate          Assessment  · Headache     784.0/R51  · Palpitations     785.1/R00.2  · TIA (transient ischemic attack)     435.9/G45.9  · Mental status change resolved     V11.8/Z86.59  · Frontal sinusitis     473.1/J32.1      Plan  · Orders  o CMP [facility] ([id number]) - 785.1/R00.2, 784.0/R51, 435.9/G45.9 - 05/16/2019  o IOP - Urinalysis without Microscopy (Clinitek) [facility] ([id number]) - V11.8/Z86.59 - 05/16/2019   GLU NEG, ISAIAS NEG, KET NEG, SG 1.020, BLO NEG, PH 6.0, PRO 30 MG/DL, URO 0.2 E.U./DL, NIT NEG, LASHAWN NEG   o Urine culture ([id number], 83923) - V11.8/Z86.59 - 05/16/2019  o ACO-39: Current medications updated and reviewed () - - 05/16/2019  o Carotid Doppler Bilateral [facility] (17465) - 784.0/R51, 435.9/G45.9 - 05/16/2019  o MRI brain and brain stem wo contrast (84877) - 784.0/R51, 435.9/G45.9 - 05/16/2019  o NEUROLOGY CONSULTATION. (NEURO) - 784.0/R51, 435.9/G45.9 - 05/16/2019  o EKG (80963) - 785.1/R00.2 - 05/16/2019  · Medications  o cefdinir 300 mg oral capsule   SIG:  take 1 capsule (300 mg) by oral route every 12 hours for 7 days   DISP: (14) capsules with 0 refills  Prescribed on 05/16/2019     o Probiotic 20 billion cell oral capsule   SIG: take 1 capsule by oral route 2 times a day for 10 days   DISP: (20) capsules with 0 refills  Prescribed on 05/16/2019     o Singulair 10 mg oral tablet   SIG: take 1 tablet (10 mg) by oral route once daily in the evening for 14 days   DISP: (14) tablets with 0 refills  Prescribed on 05/16/2019     · Instructions  o Patient was educated/instructed on their diagnosis, treatment and medications prior to discharge from the clinic today.            Electronically Signed by: Otis Jimenez MD -Author on May 16, 2019 12:12:23 PM

## 2021-05-07 NOTE — PROGRESS NOTES
Progress Note      Patient Name: Patsy Vidal   Patient ID: [id number]   Sex: Female   YOB: 1925    Primary Care Provider: Otis Jimenez MD   Referring Provider: Otis Jimenez MD    Visit Date: February 28, 2019    Provider: Otis Jimenez MD   Location: [facility]   Location Address: [address] [place] [address]  [id number]   Location Phone: [phone]          Chief Complaint     med refill       History Of Present Illness  Patsy Vidal is a 89 year old /Black female who presents for evaluation and treatment of:      pt needs med refill on flexeril- this controls her back pain- pain stable on med  needs labs       Past Medical History  Disease Name Date Onset Notes   Allergic rhinitis --  --    Edema --  --    Fracture, hip --  --    GERD (gastroesophageal reflux disease) --  --    Hypercholesterolemia --  --    Hypertension --  --    Hypothyroidism --  --    Urinary tract infection --  --    Weight Loss --  --          Past Surgical History  Procedure Name Date Notes   Hip Replacement --  --          Medication List  Name Date Started Instructions   atenolol 25 mg oral tablet 09/13/2018 take 1 tablet (25 mg) by oral route once daily for 90 days   atorvastatin 20 mg oral tablet 09/13/2018 take 1 tablet (20 mg) by oral route once daily at bedtime for 90 days   Calcium 500 + D 500 mg(1,250mg) -200 unit oral tablet 09/13/2018 take 1 tablet by oral route 3 times a day for 90 days   hydrochlorothiazide 12.5 mg oral tablet 10/29/2018 take 1 tablet (12.5 mg) by oral route once daily for 30 days   levothyroxine 125 mcg oral tablet 11/07/2018 take 1 tablet by oral route every other day for 90 days   meclizine 12.5 mg oral tablet 09/13/2018 take 1 tablet 3 times daily as needed for vertigo   Myrbetriq 25 mg oral tablet extended release 24 hr 10/29/2018 take 1 tablet (25 mg) by oral route once daily swallowing whole with water. Do not crush, chew and/or  "divide. for 30 days   omeprazole 20 mg oral capsule,delayed release(/EC) 09/13/2018 take 1 capsule (20 mg) by oral route once daily before a meal for 90 days   Zyrtec 10 mg oral tablet 09/13/2018 take 1 tablet (10 mg) by oral route once daily for 90 days         Allergy List  Allergen Name Date Reaction Notes   NO KNOWN DRUG ALLERGIES --  --  --          Social History  Finding Status Start/Stop Quantity Notes   Tobacco Never --/-- --  --          Immunizations  NameDate Admin Mfg Trade Name Lot Number Route Inj VIS Given VIS Publication   [id number]/01/2018 [facility] Fluzone High-Dose [id number] IM LD 10/01/2018 08/07/2015   Comments: ndc [id number]         Review of Systems  · Constitutional  o Denies  o : fatigue, fever  · Cardiovascular  o Denies  o : chest pain, palpitations  · Respiratory  o Denies  o : shortness of breath, cough  · Gastrointestinal  o Denies  o : nausea, vomiting, diarrhea  · Psychiatric  o Denies  o : anxiety, depression      Vitals  Date Time BP Position Site L\R Cuff Size HR RR TEMP(F) WT  HT  BMI kg/m2 BSA m2 O2 Sat        02/28/2019 11:18 /82 Sitting    81 - R  98.6 160lbs 0oz 5'  4\" 27.46 1.81 96 %          Physical Examination  · Constitutional  o Appearance  o : well developed, well-nourished, in no acute distress  · Respiratory  o Respiratory Effort  o : breathing unlabored  o Auscultation of Lungs  o : clear to ascultation  · Cardiovascular  o Heart  o :   § Auscultation of Heart  § : regular rate and rhythm  o Peripheral Vascular System  o :   § Extremities  § : no edema  · Gastrointestinal  o Abdomen  o : soft, non-tender, non-distended, + bowel sounds, no hepatosplenomegaly, no masses palpated  · Musculoskeletal  o General  o :   § General Musculoskeletal  § : No joint swelling or deformity., Muscle tone, strength, and development grossly normal.  · Neurologic  o Gait and Station  o :   § Gait Screening  § : normal gait  · Psychiatric  o Mood and Affect  o : mood normal, " affect appropriate          Assessment  · Essential hypertension     401.9/I10  · Hyperlipidemia     272.4/E78.5  · Hypothyroidism     244.9/E03.9      Plan  · Orders  o CMP [facility] ([id number]) - 272.4/E78.5 - 02/28/2019  o Lipid Panel [facility] (72816) - 272.4/E78.5 - 02/28/2019  o ACO-39: Current medications updated and reviewed () - - 02/28/2019  · Medications  o cyclobenzaprine 5 mg oral tablet   SIG: take 1 tablet by oral route 2 times a day as needed for 90 days pain   DISP: (180) tablets with 1 refills  Prescribed on 02/28/2019     o atenolol 25 mg oral tablet   SIG: take 1 tablet (25 mg) by oral route once daily for 90 days   DISP: (90) tablet with 1 refills  Adjusted on 02/28/2019     o atorvastatin 20 mg oral tablet   SIG: take 1 tablet (20 mg) by oral route once daily at bedtime for 90 days   DISP: (90) tablet with 1 refills  Adjusted on 02/28/2019     o Calcium 500 + D 500 mg(1,250mg) -200 unit oral tablet   SIG: take 1 tablet by oral route 3 times a day for 90 days   DISP: (270) tablet with 1 refills  Adjusted on 02/28/2019     o hydrochlorothiazide 12.5 mg oral tablet   SIG: take 1 tablet (12.5 mg) by oral route once daily for 90 days   DISP: (90) tablets with 1 refills  Adjusted on 02/28/2019     o levothyroxine 125 mcg oral tablet   SIG: take 1 tablet by oral route every other day for 90 days   DISP: (45) tablets with 1 refills  Adjusted on 02/28/2019     o meclizine 12.5 mg oral tablet   SIG: take 1 tablet 3 times daily as needed for vertigo   DISP: (90) tablet with 1 refills  Adjusted on 02/28/2019     o Myrbetriq 25 mg oral tablet extended release 24 hr   SIG: take 1 tablet (25 mg) by oral route once daily swallowing whole with water. Do not crush, chew and/or divide. for 90 days   DISP: (90) tablets with 1 refills  Adjusted on 02/28/2019     o omeprazole 20 mg oral capsule,delayed release(DR/EC)   SIG: take 1 capsule (20 mg) by oral route once daily before a meal for 90 days   DISP: (90) capsule with 1  refills  Adjusted on 02/28/2019     o Zyrtec 10 mg oral tablet   SIG: take 1 tablet (10 mg) by oral route once daily for 90 days   DISP: (90) tablet with 1 refills  Adjusted on 02/28/2019     · Instructions  o Advised that cheeses and other sources of dairy fats, animal fats, fast food, and the extras (candy, pasteries, pies, doughnuts and cookies) all contain LDL raising nutrients. Advised to increase fruits, vegetables, whole grains, and to monitor portion sizes.   o Patient was educated/instructed on their diagnosis, treatment and medications prior to discharge from the clinic today.            Electronically Signed by: Otis Jimenez MD -Author on March 4, 2019 11:53:40 AM

## 2021-05-07 NOTE — PROGRESS NOTES
Progress Note      Patient Name: Patsy Vidal   Patient ID: [id number]   Sex: Female   YOB: 1925    Primary Care Provider: Otis Jimenez MD   Referring Provider: Otis Jimenez MD    Visit Date: February 22, 2020    Provider: Otis Jimenez MD   Location: [facility]   Location Address: [address] [place] [address]   [place]  [address]   Location Phone: [phone]          Chief Complaint     PATIENT IS HERE WITH HOT AND COLD, STUFFY /RUNNY NOSE, A LITTLE DIZZY, TIRED AND WEAK.  THIS HAS ALL BEEN GOING ON FOR ABOUT A WEEK.       History Of Present Illness  Patsy Vidal is a 89 year old /Black female who presents for evaluation and treatment of:      cold symptoms x 1 week- sudden onset, worsening symptoms- Tylenol not helping- see ros for symptoms  hypothyroidism- unknown control- pt has some fatigue  hyperlipidemia- unknown control- need labs to assess  pt tolerating meds well  pt has been having more muscle cramps in last few weeks intermittently       Past Medical History  Disease Name Date Onset Notes   Allergic rhinitis --  --    Edema --  --    Fracture, hip --  --    GERD (gastroesophageal reflux disease) --  --    Hypercholesterolemia --  --    Hypertension --  --    Hypothyroidism --  --    Urinary tract infection --  --    Weight Loss --  --          Past Surgical History  Procedure Name Date Notes   Hip Replacement --  --          Medication List  Name Date Started Instructions   aspirin 81 mg oral tablet,chewable  chew 1 tablet (81 mg) by oral route once daily   atenolol 25 mg oral tablet 12/12/2019 take 1 tablet (25 mg) by oral route once daily for 90 days   atorvastatin 20 mg oral tablet 09/24/2019 take 1 tablet (20 mg) by oral route once daily at bedtime for 90 days   Calcium 500 + D 500 mg(1,250mg) -200 unit oral tablet 09/24/2019 take 1 tablet by oral route 3 times a day for 90 days   cyclobenzaprine 5 mg oral tablet 02/22/2020 take 1 tablet  by oral route 2 times a day as needed for 90 days pain   hydrochlorothiazide 12.5 mg oral tablet 09/24/2019 take 1 tablet (12.5 mg) by oral route once daily for 90 days   levothyroxine 137 mcg oral tablet 12/12/2019 take 1 tablet by oral route every other day for 90 days   meclizine 12.5 mg oral tablet 09/24/2019 take 1 tablet 3 times daily as needed for vertigo   Mobic 7.5 mg oral tablet 09/24/2019 take 1 tablet (7.5 mg) by oral route once daily for 10 days   Myrbetriq 25 mg oral tablet extended release 24 hr 12/12/2019 take 1 tablet (25 mg) by oral route once daily swallowing whole with water. Do not crush, chew and/or divide. for 90 days   omeprazole 20 mg oral capsule,delayed release(DR/EC) 12/12/2019 take 1 capsule (20 mg) by oral route once daily before a meal for 90 days   Singulair 10 mg oral tablet 09/24/2019 take 1 tablet (10 mg) by oral route once daily in the evening for 30 days   Zyrtec 10 mg oral tablet 09/24/2019 take 1 tablet (10 mg) by oral route once daily for 90 days         Allergy List  Allergen Name Date Reaction Notes   NO KNOWN DRUG ALLERGIES --  --  --        Allergies Reconciled  Social History  Finding Status Start/Stop Quantity Notes   Tobacco Never --/-- --  --          Immunizations  NameDate Admin Mfg Trade Name Lot Number Route Inj VIS Given VIS Publication   [id number]/24/2019 [facility] FLUZONE-HIGH DOSE [id number]   09/24/2019    Comments: NDC#[id number]   [id number]/01/2018 [facility] FLUZONE-HIGH DOSE [id number]  LD 10/01/2018 08/07/2015   Comments: ndc [id number]         Review of Systems  · Constitutional  o Admits  o : fatigue, fever  · HENT  o Admits  o : sinus pain, nasal congestion, nasal discharge, sore throat  · Cardiovascular  o Denies  o : chest pain, palpitations  · Respiratory  o Admits  o : cough  o Denies  o : shortness of breath  · Gastrointestinal  o Denies  o : nausea, vomiting, diarrhea  · Genitourinary  o Admits  o : urgency, frequency  o Denies  o : dysuria, urinary  "retention      Vitals  Date Time BP Position Site L\R Cuff Size HR RR TEMP (F) WT  HT  BMI kg/m2 BSA m2 O2 Sat HC       02/22/2020 08:28 /78 Sitting    70 - R  98 146lbs 16oz 5'  4\" 25.23 1.74 95 %          Physical Examination  · Constitutional  o Appearance  o : well developed, well-nourished, in no acute distress  · Head and Face  o HEENT  o : MMM, erythema of throat, uvula midline, throat open, no abscesses seen, TM's bilaterally clear, bilateral frontal sinus tenderness  · Respiratory  o Respiratory Effort  o : breathing unlabored  o Auscultation of Lungs  o : clear to ascultation  · Cardiovascular  o Heart  o :   § Auscultation of Heart  § : regular rate and rhythm  o Peripheral Vascular System  o :   § Extremities  § : no edema  · Gastrointestinal  o Abdomen  o : soft, non-tender, non-distended, + bowel sounds, no hepatosplenomegaly, no masses palpated, no CVA tenderness.  · Musculoskeletal  o General  o :   § General Musculoskeletal  § : No joint swelling or deformity., Muscle tone, strength, and development grossly normal.  · Neurologic  o Gait and Station  o :   § Gait Screening  § : normal gait  · Psychiatric  o Mood and Affect  o : mood normal, affect appropriate          Assessment  · Hyperlipidemia     272.4/E78.5  · Hypothyroidism     244.9/E03.9  · Urinary symptom or sign     788.99/R39.9  · Pharyngitis     462/J02.9  · Muscle cramps     729.82/R25.2  · Strep throat     034.0/J02.0      Plan  · Orders  o Thyroid Profile (THYII) - 244.9/E03.9 - 02/22/2020  o CBC with Auto Diff [facility] (66113) - 788.99/R39.9, 462/J02.9, 729.82/R25.2 - 02/22/2020  o CMP [facility] ([id number]) - 788.99/R39.9, 462/J02.9, 729.82/R25.2 - 02/22/2020  o Lipid Panel [facility] (67032) - 272.4/E78.5 - 02/22/2020  o IOP - Rapid Strep (36895) - 788.99/R39.9, 462/J02.9 - 02/22/2020   STREP- POSITIVE  o IOP - Urinalysis without Microscopy (Clinitek) [facility] ([id number]) - 788.99/R39.9, 462/J02.9 - 02/22/2020   GLU- NEG ISAIAS- NEG KET- NEG SG- 1.025 BLO- NEG " PH- 5.5 PRO- NEG URO- 0.2 NIT- NEG LASHAWN- NEG  o ACO-39: Current medications updated and reviewed () - - 02/22/2020  o Magnesium level (92775) - 788.99/R39.9, 462/J02.9, 729.82/R25.2 - 02/22/2020  · Medications  o cefdinir 300 mg oral capsule   SIG: take 1 capsule (300 mg) by oral route every 12 hours for 7 days   DISP: (14) capsules with 0 refills  Prescribed on 02/22/2020     o Probiotic 20 billion cell oral capsule   SIG: take 1 capsule by oral route 2 times a day for 10 days   DISP: (20) capsules with 0 refills  Prescribed on 02/22/2020     o cyclobenzaprine 5 mg oral tablet   SIG: take 1 tablet by oral route 2 times a day as needed for 90 days pain   DISP: (180) tablets with 1 refills  Adjusted on 02/22/2020     o Medications have been Reconciled  o Transition of Care or Provider Policy  · Instructions  o Advised that cheeses and other sources of dairy fats, animal fats, fast food, and the extras (candy, pasteries, pies, doughnuts and cookies) all contain LDL raising nutrients. Advised to increase fruits, vegetables, whole grains, and to monitor portion sizes.   o Patient was educated/instructed on their diagnosis, treatment and medications prior to discharge from the clinic today.            Electronically Signed by: Otis Jimenez MD -Author on February 25, 2020 07:57:33 PM

## 2021-05-09 VITALS
TEMPERATURE: 98.5 F | SYSTOLIC BLOOD PRESSURE: 138 MMHG | HEIGHT: 64 IN | BODY MASS INDEX: 26.31 KG/M2 | HEART RATE: 67 BPM | DIASTOLIC BLOOD PRESSURE: 64 MMHG | OXYGEN SATURATION: 98 % | WEIGHT: 154.12 LBS

## 2021-05-09 VITALS
WEIGHT: 167 LBS | OXYGEN SATURATION: 94 % | TEMPERATURE: 97.9 F | DIASTOLIC BLOOD PRESSURE: 84 MMHG | SYSTOLIC BLOOD PRESSURE: 120 MMHG | HEART RATE: 95 BPM

## 2021-05-09 VITALS
HEART RATE: 72 BPM | TEMPERATURE: 96.9 F | SYSTOLIC BLOOD PRESSURE: 160 MMHG | DIASTOLIC BLOOD PRESSURE: 80 MMHG | OXYGEN SATURATION: 97 % | WEIGHT: 157 LBS

## 2021-05-09 VITALS
OXYGEN SATURATION: 98 % | WEIGHT: 172.06 LBS | BODY MASS INDEX: 29.38 KG/M2 | DIASTOLIC BLOOD PRESSURE: 60 MMHG | HEART RATE: 72 BPM | SYSTOLIC BLOOD PRESSURE: 114 MMHG | TEMPERATURE: 98.2 F | HEIGHT: 64 IN

## 2021-05-09 VITALS
SYSTOLIC BLOOD PRESSURE: 120 MMHG | DIASTOLIC BLOOD PRESSURE: 70 MMHG | OXYGEN SATURATION: 100 % | TEMPERATURE: 99 F | WEIGHT: 164 LBS | HEART RATE: 110 BPM

## 2021-05-09 VITALS
HEIGHT: 64 IN | HEART RATE: 81 BPM | DIASTOLIC BLOOD PRESSURE: 82 MMHG | BODY MASS INDEX: 27.31 KG/M2 | OXYGEN SATURATION: 96 % | TEMPERATURE: 98.6 F | SYSTOLIC BLOOD PRESSURE: 136 MMHG | WEIGHT: 160 LBS

## 2021-05-09 VITALS
HEART RATE: 70 BPM | BODY MASS INDEX: 25.1 KG/M2 | WEIGHT: 147 LBS | DIASTOLIC BLOOD PRESSURE: 78 MMHG | TEMPERATURE: 98 F | HEIGHT: 64 IN | OXYGEN SATURATION: 95 % | SYSTOLIC BLOOD PRESSURE: 128 MMHG

## 2021-05-09 VITALS
OXYGEN SATURATION: 98 % | TEMPERATURE: 98.9 F | WEIGHT: 155 LBS | HEIGHT: 64 IN | DIASTOLIC BLOOD PRESSURE: 74 MMHG | HEART RATE: 72 BPM | SYSTOLIC BLOOD PRESSURE: 118 MMHG | BODY MASS INDEX: 26.46 KG/M2

## 2021-05-09 VITALS
DIASTOLIC BLOOD PRESSURE: 62 MMHG | SYSTOLIC BLOOD PRESSURE: 138 MMHG | WEIGHT: 164 LBS | HEIGHT: 64 IN | OXYGEN SATURATION: 98 % | HEART RATE: 69 BPM | TEMPERATURE: 99.9 F | BODY MASS INDEX: 28 KG/M2

## 2021-05-09 VITALS
TEMPERATURE: 98.4 F | HEART RATE: 75 BPM | DIASTOLIC BLOOD PRESSURE: 88 MMHG | OXYGEN SATURATION: 96 % | WEIGHT: 161.31 LBS | SYSTOLIC BLOOD PRESSURE: 130 MMHG

## 2021-05-09 VITALS
SYSTOLIC BLOOD PRESSURE: 122 MMHG | WEIGHT: 164 LBS | OXYGEN SATURATION: 95 % | HEART RATE: 73 BPM | DIASTOLIC BLOOD PRESSURE: 60 MMHG | TEMPERATURE: 99.6 F | BODY MASS INDEX: 28 KG/M2 | HEIGHT: 64 IN

## 2021-05-09 VITALS
HEART RATE: 95 BPM | DIASTOLIC BLOOD PRESSURE: 60 MMHG | TEMPERATURE: 99.1 F | SYSTOLIC BLOOD PRESSURE: 124 MMHG | OXYGEN SATURATION: 96 % | WEIGHT: 165 LBS

## 2021-05-09 VITALS
WEIGHT: 165.37 LBS | DIASTOLIC BLOOD PRESSURE: 78 MMHG | TEMPERATURE: 98.4 F | HEART RATE: 66 BPM | SYSTOLIC BLOOD PRESSURE: 132 MMHG | OXYGEN SATURATION: 98 %

## 2021-05-10 NOTE — H&P
History and Physical      Patient Name: Patsy Vidal   Patient ID: [id number]   Sex: Female   YOB: 1925    Referring Provider: Otis Jimenez MD    Visit Date: August 21, 2020    Provider: Brooklynn Craig PA-C   Location: [facility]   Location Address: [address] [place] [address]  [id number]   Location Phone: [phone]          Chief Complaint  · Right hip injury      History Of Present Illness  Patsy Vidal is a 89 year old /Black female who presents today to [facility].      She is here for right periprosthetic femur fracture follow up. She is residing at [facility]. She states moderate to severe pain in right hip.       Past Medical History  Anemia, Unspecified; Arthritis; Asthma; Bladder Disorder; Bursitis of hip, right; Cancer; Congestive heart failure; Diabetes; Hypertension; Kidney Disease; Limb Swelling; Nocturia; Primary osteoarthritis of one knee, right; Primary osteoarthritis of right hip; Rectal bleeding; Reflux; Seasonal allergies; Shortness of Breath; Stomach Disorder; Stroke; Thyroid disorder; Urge Incontinence; Urgency of urination; Urinary frequency; Urinary Incontinence         Past Surgical History  Appendectomy; Artificial Joints/Limbs; Cholecstectomy; Colonoscopy; Eye Implant; heart surgery; Hemiarthroplasty of right hip; Hysterectomy; Joint Surgery; Partial hysterectomy         Medication List  atenolol 25 mg oral tablet; Calcium 600 600 mg calcium (1,500 mg) oral tablet; cetirizine 10 mg oral tablet; Dyrenium 50 mg oral capsule; meclizine 12.5 mg oral tablet; meloxicam 7.5 mg oral tablet; omeprazole 20 mg oral capsule,delayed release(DR/EC); Synthroid 100 mcg oral tablet; triamterene-hydrochlorothiazid 37.5-25 mg oral tablet; Vesicare 5 mg oral tablet         Allergy List  No known history of drug allergy       Allergies Reconciled  Family Medical History  - No Family History of Colorectal Cancer; Family history of certain chronic  "disabling diseases; arthritis         Social History  Alcohol Use (Never); Homemaker.; lives alone; Recreational Drug Use (Never); Retired.; Tobacco (Never);          Review of Systems  · Constitutional  o Denies  o : fever, chills, weight loss  · Cardiovascular  o Denies  o : chest pain, shortness of breath  · Gastrointestinal  o Denies  o : liver disease, heartburn, nausea, blood in stools  · Genitourinary  o Denies  o : painful urination, blood in urine  · Integument  o Denies  o : rash, itching  · Neurologic  o Denies  o : headache, weakness, loss of consciousness  · Musculoskeletal  o Denies  o : painful, swollen joints  · Psychiatric  o Denies  o : drug/alcohol addiction, anxiety, depression      Vitals  Date Time BP Position Site L\R Cuff Size HR RR TEMP (F) WT  HT  BMI kg/m2 BSA m2 O2 Sat HC       08/21/2020 10:35 AM      71 - R   145lbs 0oz 5'  3\" 25.69 1.71 95 %          Physical Examination  · Constitutional  o Appearance  o : well developed, well-nourished, no obvious deformities present  · Head and Face  o Head  o :   § Inspection  § : normocephalic  o Face  o :   § Inspection  § : no facial lesions  · Eyes  o Conjunctivae  o : conjunctivae normal  o Sclerae  o : sclerae white  · Ears, Nose, Mouth and Throat  o Ears  o :   § External Ears  § : appearance within normal limits  § Hearing  § : intact  o Nose  o :   § External Nose  § : appearance normal  · Neck  o Inspection/Palpation  o : normal appearance  o Range of Motion  o : full range of motion  · Respiratory  o Respiratory Effort  o : breathing unlabored  o Inspection of Chest  o : normal appearance  o Auscultation of Lungs  o : no audible wheezing or rales  · Cardiovascular  o Heart  o : regular rate  · Gastrointestinal  o Abdominal Examination  o : soft and non-tender  · Skin and Subcutaneous Tissue  o General Inspection  o : intact, no rashes  · Psychiatric  o General  o : Alert and oriented x3  o Judgement and Insight  o : judgment and " insight intact  o Mood and Affect  o : mood normal, affect appropriate  · Right Hip  o Inspection  o : Old scar. + Swelling. + tenderness. ER of hip. Appears grossly Neurovascularly intact. Presents in wheelchair.   · In Office Procedures  o View  o : AP/LATERAL  o Site  o : right, hip   o Indication  o : Right hip pain   o Study  o : X-rays ordered, taken in the office, and reviewed today.  o Xray  o : Further displacement of right periprosthetic femur fracture.   o Comparative Data  o : Comparative Data found and reviewed today           Assessment  · Right Pain: Hip     719.45/M25.559  · Femur fracture, right     821.00/S72.91XA      Plan  · Orders  o Hip (Right) 2 or more views (includes AP Pelvis) [facility] Preferred View. ([id number]) - 719.45/M25.559 - 08/21/2020  · Medications  o Medications have been Reconciled  o Transition of Care or Provider Policy  · Instructions  o Dr. Kapadia saw and examined the patient and agrees with plan.   o X-rays reviewed by Dr. Kapadia.  o Reviewed the patient's Past Medical, Social, and Family history as well as the ROS at today's visit, no changes.  o Call or return if worsening symptoms.  o Patient will be referred to ortho trauma at [facility]. Dr. Velarde has agreed to accept patient.   o I spoke with Dr. Laughlin, attending physician at [facility], and he will facilitate transfer to [facility]. Patient's emergency contact updated.   o Electronically Identified Patient Education Materials Provided Electronically            Electronically Signed by: ARA Stark-C -Author on August 21, 2020 12:29:20 PM  Electronically Co-signed by: Joseph Kapadia MD -Reviewer on August 22, 2020 10:41:03 AM

## 2021-05-13 NOTE — PROGRESS NOTES
Progress Note      Patient Name: Patsy Vidal   Patient ID: [id number]   Sex: Female   YOB: 1925    Referring Provider: tOis Jimenez MD    Visit Date: July 8, 2020    Provider: Zoë Reilly PA-C   Location: [facility]   Location Address: [address] [place] [address]  [id number]   Location Phone: [phone]          Chief Complaint  · Right hip pain      History Of Present Illness  Patsy Vidal is a 89 year old /Black female who presents today to [facility].      Patient is following up for right hip pain. Patient has a history of right hip hemiarthroplasty performed in 2013. Patient is using a walker today.            Past Medical History  Anemia, Unspecified; Arthritis; Asthma; Bladder Disorder; Bursitis of hip, right; Cancer; Congestive heart failure; Diabetes; Hypertension; Kidney Disease; Limb Swelling; Nocturia; Primary osteoarthritis of one knee, right; Primary osteoarthritis of right hip; Rectal bleeding; Reflux; Seasonal allergies; Shortness of Breath; Stomach Disorder; Stroke; Thyroid disorder; Urge Incontinence; Urgency of urination; Urinary frequency; Urinary Incontinence         Past Surgical History  Appendectomy; Artificial Joints/Limbs; Cholecstectomy; Colonoscopy; Eye Implant; heart surgery; Hemiarthroplasty of right hip; Hysterectomy; Joint Surgery; Partial hysterectomy         Medication List  atenolol 25 mg oral tablet; Calcium 600 600 mg calcium (1,500 mg) oral tablet; cetirizine 10 mg oral tablet; Dyrenium 50 mg oral capsule; meclizine 12.5 mg oral tablet; meloxicam 7.5 mg oral tablet; omeprazole 20 mg oral capsule,delayed release(DR/EC); Synthroid 100 mcg oral tablet; triamterene-hydrochlorothiazid 37.5-25 mg oral tablet; Vesicare 5 mg oral tablet         Allergy List  No known history of drug allergy         Family Medical History  - No Family History of Colorectal Cancer; Family history of certain chronic disabling diseases;  "arthritis         Social History  Alcohol Use (Never); Homemaker.; lives alone; Recreational Drug Use (Never); Retired.; Tobacco (Never);          Review of Systems  · Constitutional  o Denies  o : fever, chills, weight loss  · Cardiovascular  o Denies  o : chest pain, shortness of breath  · Gastrointestinal  o Denies  o : liver disease, heartburn, nausea, blood in stools  · Genitourinary  o Denies  o : painful urination, blood in urine  · Integument  o Denies  o : rash, itching  · Neurologic  o Denies  o : headache, weakness, loss of consciousness  · Musculoskeletal  o Denies  o : painful, swollen joints  · Psychiatric  o Denies  o : drug/alcohol addiction, anxiety, depression      Vitals  Date Time BP Position Site L\R Cuff Size HR RR TEMP (F) WT  HT  BMI kg/m2 BSA m2 O2 Sat        07/08/2020 01:22 PM      76 - R   149lbs 6oz 5'  3\" 26.46 1.74 97 %          Physical Examination  · Constitutional  o Appearance  o : well developed, well-nourished, no obvious deformities present  · Head and Face  o Head  o :   § Inspection  § : normocephalic  o Face  o :   § Inspection  § : no facial lesions  · Eyes  o Conjunctivae  o : conjunctivae normal  o Sclerae  o : sclerae white  · Ears, Nose, Mouth and Throat  o Ears  o :   § External Ears  § : appearance within normal limits  § Hearing  § : intact  o Nose  o :   § External Nose  § : appearance normal  · Neck  o Inspection/Palpation  o : normal appearance  o Range of Motion  o : full range of motion  · Respiratory  o Respiratory Effort  o : breathing unlabored  o Inspection of Chest  o : normal appearance  o Auscultation of Lungs  o : no audible wheezing or rales  · Cardiovascular  o Heart  o : regular rate  · Gastrointestinal  o Abdominal Examination  o : soft and non-tender  · Skin and Subcutaneous Tissue  o General Inspection  o : intact, no rashes  · Psychiatric  o General  o : Alert and oriented x3  o Judgement and Insight  o : judgment and insight intact  o Mood " and Affect  o : mood normal, affect appropriate  · Right Hip  o Inspection  o : Ambulating with a walker. Well-healed scars. No signs of infection. Decreased range of motion. Tender lateral hip. Neurovascularly intact. Sensation grossly intact.   · Injection Note/Aspiration Note  o Site  o : right hip  o Procedure  o : Procedure: After educating the patient, patient gave consent for procedure. After using Chloraprep, the joint space was injected. The patient tolerated the procedure well.  o Medication  o : 80 mg of DepoMedrol with 9cc of 1% Lidocaine  · In Office Procedures  o View  o : AP/LATERAL  o Site  o : right, hip  o Indication  o : Right hip pain  o Study  o : X-rays ordered, taken in the office, and reviewed today.  o Xray  o : loosening of femoral stem. Heterotropic ossification  o Comparative Data  o : No comparative data found          Assessment  · Trochanteric Bursitis     726.5/M70.60  · Right Pain: Hip     719.45/M25.559      Plan  · Orders  o Depo-Medrol injection 80mg () - 719.45/M25.559 - 07/08/2020   Lot [id number] 06 2021 [employer] Administered by AZALIA SALAZAR PA-C  o Hip Intra-articular Injection without US Guidance [facility] ([id number]) - 719.45/M25.559 - 07/08/2020   Lot [id number] Exp 07 2021 [employer] Administered by AZALIA SALAZAR PA-C  o Hip (Right) 2 or more views (includes AP Pelvis) [facility] Preferred View. ([id number]) - 719.45/M25.559 - 07/08/2020  · Medications  o Medications have been Reconciled  o Transition of Care or Provider Policy  · Instructions  o Reviewed the patient's Past Medical, Social, and Family history as well as the ROS at today's visit, no changes.  o Call or return if worsening symptoms.  o X-ray ordered, taken and reviewed at this visit.  o Follow Up PRN.  o Electronically Identified Patient Education Materials Provided Electronically            Electronically Signed by: Zoë Salazar PA-C -Author on July 8, 2020 02:39:49 PM

## 2021-05-15 VITALS — HEIGHT: 63 IN | WEIGHT: 165 LBS | BODY MASS INDEX: 29.23 KG/M2 | HEART RATE: 75 BPM | OXYGEN SATURATION: 97 %

## 2021-05-15 VITALS — HEIGHT: 63 IN | WEIGHT: 149.37 LBS | HEART RATE: 76 BPM | BODY MASS INDEX: 26.46 KG/M2 | OXYGEN SATURATION: 97 %

## 2021-05-15 VITALS — WEIGHT: 165 LBS | OXYGEN SATURATION: 97 % | HEIGHT: 63 IN | BODY MASS INDEX: 29.23 KG/M2 | HEART RATE: 63 BPM

## 2021-05-15 VITALS — HEART RATE: 71 BPM | BODY MASS INDEX: 25.69 KG/M2 | OXYGEN SATURATION: 95 % | HEIGHT: 63 IN | WEIGHT: 145 LBS

## 2021-05-16 VITALS — BODY MASS INDEX: 29.26 KG/M2 | WEIGHT: 165.12 LBS | HEIGHT: 63 IN | OXYGEN SATURATION: 94 % | HEART RATE: 77 BPM

## 2021-05-16 VITALS — BODY MASS INDEX: 29.25 KG/M2 | WEIGHT: 165.06 LBS | HEIGHT: 63 IN | RESPIRATION RATE: 12 BRPM

## 2021-05-16 VITALS — OXYGEN SATURATION: 97 % | HEIGHT: 63 IN | HEART RATE: 77 BPM | BODY MASS INDEX: 31.18 KG/M2 | WEIGHT: 176 LBS

## 2021-05-16 VITALS — HEART RATE: 80 BPM | BODY MASS INDEX: 27.64 KG/M2 | HEIGHT: 63 IN | OXYGEN SATURATION: 97 % | WEIGHT: 156 LBS

## 2021-05-26 ENCOUNTER — OFFICE VISIT CONVERTED (OUTPATIENT)
Dept: ORTHOPEDIC SURGERY | Facility: CLINIC | Age: 86
End: 2021-05-26
Attending: ORTHOPAEDIC SURGERY

## 2021-05-28 VITALS
HEIGHT: 61 IN | HEIGHT: 61 IN | DIASTOLIC BLOOD PRESSURE: 63 MMHG | HEART RATE: 74 BPM | HEIGHT: 61 IN | HEART RATE: 64 BPM | BODY MASS INDEX: 31.88 KG/M2 | WEIGHT: 166.01 LBS | DIASTOLIC BLOOD PRESSURE: 60 MMHG | SYSTOLIC BLOOD PRESSURE: 144 MMHG | SYSTOLIC BLOOD PRESSURE: 139 MMHG | TEMPERATURE: 99.4 F | HEART RATE: 72 BPM | WEIGHT: 163.58 LBS | BODY MASS INDEX: 31.34 KG/M2 | SYSTOLIC BLOOD PRESSURE: 146 MMHG | RESPIRATION RATE: 18 BRPM | TEMPERATURE: 98.6 F | DIASTOLIC BLOOD PRESSURE: 67 MMHG | OXYGEN SATURATION: 100 % | BODY MASS INDEX: 30.88 KG/M2 | OXYGEN SATURATION: 97 % | TEMPERATURE: 98.7 F | OXYGEN SATURATION: 99 % | WEIGHT: 168.87 LBS

## 2021-05-28 NOTE — PROGRESS NOTES
Patient: UMU CORTEZ     Acct: [id number]     Report: #[id number]  UNIT #: [id number]     : 1925    Encounter Date:2018  PRIMARY CARE: [facility]  ***Signed***  --------------------------------------------------------------------------------------------------------------------  Chief Complaint      HEMOCHROMATOSIS      3 MONTH FOLLOW UP.            Referring Provider/Copies To      Provider Not Found in Lookup:  [facility]            Allergies      Coded Allergies:             *No Known Allergies (Verified  Allergy, Unknown, 18)            Medications      Last Reconciled on 18 19:48 by POLLY MORIN MD      Solifenacin (Vesicare*) 5 Mg Tab      5 MG PO QDAY, TAB         Reported         17       Meloxicam (Meloxicam*) 7.5 Mg Tablet      7.5 MG PO QDAY, #30 TAB 0 Refills         Reported         17       Cetirizine Hcl (ZyrTEC*) 10 Mg Tablet      10 MG PO HS, #30 TAB 0 Refills         Reported         17       Calcium Carb/Vitamin D3 (Osteo-Poretical Tablet) 1 Ea Tab      1 EACH PO QDAY, TAB         Reported         17       Levothyroxine Sodium (Levoxyl*) 0.112 Mg Tablet      0.112 MG PO QDAY@07, #30 TAB 0 Refills         Reported         17       Triamterene/HCTZ 37.5/25 Mg (Triamterene/HCTZ 37.5/25 Mg) 1 Tab Tablet      37.5 MG PO QDAY, TAB         Reported         14       Omeprazole (Omeprazole*) 20 Mg Capcr      1 TAB PO QDAY, CAP         Reported         14       Triamterene (Dyrenium) 50 Mg Cap      50 MG PO QDAY, #1 CAP         Reported         14       Atenolol (Tenormin*) 25 Mg Tablet      25 MG PO QDAY         Reported         10/2/08      Medications Reviewed:  No Changes made to meds            History and Present Illness      Past Oncology Illness History      This is a very pleasant 89-year-old female who presents to hematology clinic for    elevated ferritin.  Patient has received oral iron for the last 60  years.  Mayela    ent doesn't have any chest pain, abdominal pain, or any other symptoms.            -August .  WBC 5.74.  Hemoglobin 11.2.  Platelet count 140,000.  Ferritin    1372.  Iron 79.  TIBC 302.      -January .  WBC 8.53.  Hemoglobin 10.9.  Platelet count is 67,000.      Ferritin 1268            HPI - Oncology Interim      Patient described fatigue has improved. Feels like weakness. Noticed it since     patient was diagnosed with elevated ferritin. This is a intermittent fatigue and    worsened with activities of daily living.            Most Recent Lab Findings      Laboratory Tests      8/16/18 16:00            PAST, FAMILY   Past Medical History      Past Medical History:  No Diabetes Type 1, No Diabetes Type 2; Thyroid Disease;     No COPD, No Emphysema, No Hypertension, No Stroke, No High Cholesterol, No Heart    Attack, No Bleeding Condition, No Low or High RBC Count, No Low or High WBC     Count, No Low or High Platelet Coun, No Hepatitis, No Kidney Disease, No     Depression, No Alzheimer's Disease, No Mental Disease, No Seizures, No     Arthritis, No Osteoporosis, No Osteopenia, No Short of Air, No Sleep apnea, No     Liver Disease, No STD, No Enlarged Prostate, No Other      Hematology/oncology:  REPORTS HX OF: Other hematologic history (high ferritin);     DENIES HX OF: Previous Treatment for CA, Anemia, Bladder Cancer, Blood cancer,     Brain cancer, Breast cancer, Cervical cancer, Coagulopathy, Colorectal cancer,     Endocrine cancer, Eye cancer, GI cancer,  cancer, Kidney cancer, Leukemia,     Leukocytosis, Leukopenia, Liver cancer, Lung cancer, Lymphoma, Musculoskeletal     cancer, Myeloma, Neurologic cancer, Oral cancer, Ovarian cancer, Skin cancer,     Stomach cancer, Thrombocytopenia, Thyroid cancer, Uterine cancer, Other cancer     history      Genetic/metabolic:  DENIES HX OF: Cystic fibrosis, Down syndrome, Other genetic     history, Other metabolic history             Past Surgical History      REPORTS HX OF: Appendectomy, Cholecystectomy, Joint replacement, Hysterectomy     (partial); DENIES HX OF: Cataract extraction, Thyroid surgery, Lung biopsy, CABG    surgery, Coronary stent, Valve replacement, Splenectomy, Bladder surgery,     Nephrectomy, Frature repair, Skin cancer removal, Melanoma excision, Spinal     surgery, Breast biopsy, Lumpectomy, Mastectomy, bilateral, Mastectomy, right,     Mastectomy, left, Peg Tube Placement, VAD Placement, Biopsy, Other Past Surgical    Hx            Family History      REPORTS HX OF: Kidney Cancer (son); DENIES HX OF: Anemia, Blood disorders, Blood    Cancer, Breast cancer, Cervical cancer, Coagulopathy, Colorectal cancer,     Endocrine Cancer, Eye Cancer, GI Cancer,  Cancer, Leukemia, Leukocytosis,     Leukopenia, Liver Cancer, Lung cancer, Lymphoma, Melanoma, Musculoskeletal     Cancer, Myeloma, Neurologic Cancer, Oral Cancer, Ovarian cancer, Prostate     cancer, Skin Cancer, Stomach Cancer, Testicular Cancer, Thrombocytopenia,     Thyroid cancer, Uterine cancer, Other Cancer History, Other Hematology History            Social History      Lives independently:  Yes            Tobacco Use      Tobacco status:  Never smoker            Alcohol Use      Alcohol intake:  None            Substance Use      Substance use:  Denies use            REVIEW OF SYSTEMS      General:  Denies: Appetite change, Excessive sweating, Fatigue, Fever, Night     sweats, Weight gain, Weight loss, Other      Eyes:  Denies: Blurred vision, Corrective lenses, Diplopia, Eye irritation, Eye     pain, Eye redness, Spots in vision, Vision loss, Other      Ears, nose, mouth, throat:  Denies: Headache, Seizures, Visual Changes, Hearing     loss, Sinus Congestion, Hoarseness, Sore throat, Other      Cardiovascular:  Denies: Chest pain, Irregular heartbeat, Palpitations, Swollen     ankles/legs, Other      Respiratory:  Denies: Chest pain, Shortness of Air, Productive  cough, Coughing     blood, Other      Gastrointestinal:  Denies: Nausea, Vomiting, Problem swallowing, Frequent     heartburn, Constipation, Diarrhea, Tarry stools, Bloody stools, Unable to     control bowels, Other      Kidney/Bladder:  Denies: Painful Urination, Change in urinary stream, Blood in     urine, Incontinence, Frequent Urination, Decreased urine stream, Other      Musculoskeletal:  Denies: New Back pain, Leg Cramps, Painful Joints, Swollen     Joints, Muscle Pain, Muscle weakness, Other      Skin:  DENIES: Jaundice, Easy Bleeding, Lesions/changes in moles, Nail changes,     Skin Discoloration, Rash, Other      Neurological:  Denies: Dizziness, Fainting, Numbness\Tingling, Paralysis,     Seizures, Other      Psychiatric:  Complains of: AAO X 3; Denies: Anxiety, Panic attacks, Depression,    Memory loss, Other      Endocrine:  Denies Thyroid DisorderDiabetesOsteoporosisEndocrine Other      Hematologic/lymphatic:  Denies: Bruising, Bleeding, Enlarged Lymph Nodes,     Recurrent infections, Other      Reproductive:  Denies Pregnant, Denies Menopause, Denies Still Menstruating,     Denies Heavy Periods, Denies Other            VITAL SIGNS,PAIN/FATIGUE SCORE      Vitals      Height 5 ft 1.25 in / 155.58 cm      Weight 166 lbs 0.102 oz / 75.3 kg      BSA 1.83 m2      BMI 31.1 kg/m2      Temperature 98.7 F / 37.06 C - Temporal      Pulse 64      Respirations 18      Blood Pressure 146/67 Sitting, Right Arm      Pulse Oximetry 100%, RM AIR            Pain Score      Experiencing any pain?:  No            Fatigue Score      Experiencing any fatigue?:  No            PREVENTION      Hx Influenza Vaccination:  Yes      Date Influenza Vaccine Given:  Oct 1, 2016      Influenza Vaccine Declined:  No      2 or More Falls Past Year?:  No      Fall Past Year with Injury?:  No      Hx Pneumococcal Vaccination:  Yes      Encouraged to follow-up with:  PCP regarding preventative exams.      Chart initiated by      EDUARDO  RL PONCE            IMPRESSION/PLAN      Impression      -Elevated ferritin      -Differential diagnosis includes iatrogenic overeating of iron sulfate,     hemochromatosis.  Patient has never received a blood transfusion.      -Hemochromatosis gene mutation was negative.      -Patient has history of falls.  We'll give 1 L normal saline with phlebotomy      -Discussed with patient that risk of syncope with phlebotomy outweighs the     benefit at this point.  We will just continue to watch ferritin at this time            -Today's Evaluation      -Patient's imaging exams, blood tests, physicians' notes, and any new findings     since our last visit were reviewed today to reassess patient's medical treatment    plan. .       -Old medical records were reviewed and summarized in chronological order in the     HPI today to maintain an updated medical record.       -Patient's radiology imaging tests from our last visit were reviewed     independently by me by direct visualization of the images.        -Patients current lab tests and medications were carefully reviewed to evaluate     patient's current treatment plan today.       -Patient was advised to call us right away if there are any new symptoms for an     urgent visit for further evaluation. Patient voiced understanding and agreed to     do so.            Plan      Return to clinic in 6 months.        Check CBC.            Diagnosis      Elevated ferritin - R79.89            Patient Education      Patient Education Provided:  Yes      Over 50% Time Counseling Pt:  Yes                 Disclaimer: Converted document may not contain table formatting or lab diagrams. Please see [employer] for the authenticated document.

## 2021-05-28 NOTE — PROGRESS NOTES
Patient: UMU CORTEZ     Acct: [id number]     Report: #[id number]  UNIT #: [id number]     : 1925    Encounter Date:2018  PRIMARY CARE: [facility]  ***Signed***  --------------------------------------------------------------------------------------------------------------------  Chief Complaint      2018      HEMOCHROMATOSIS            Current Plan      -Hyper-Ferritinemia      -Differential diagnosis includes iatrogenic overeating of iron sulfate,     hemochromatosis.  Patient has never received a blood transfusion.      -Hemochromatosis gene mutation was negative.      -We'll also check iron saturation and TIBC.  Will also check a transferrin.      -We'll also consider a liver ultrasound if the ferritin levels are increased to     evaluate for liver abnormalities.      -Orders placed for phlebotomy.  Doing well with treatment      -Patient has history of falls.  We'll give 1 L normal saline with phlebotomy            -Interval Clinic Visit Changes      -Elevated ferritin.  Orders placed for 500 mL of phlebotomy      -Orders also placed for 1 L of normal saline            -Anemia      -Differential diagnoses includes anemia of chronic kidney disease and     nutritional deficiency.      -We'll check vitamin B12, folate, zinc, and copper.      -Continue close observation.  We will treat the elevated ferritin first.            -Clinic Evaluation      -Patient's imaging exams, blood tests, physicians' notes, and any new findings     since our last visit were reviewed today to reassess patient's medical     treatment plan.      -Old medical records were reviewed and summarized in chronological order in the     HPI today to maintain an updated medical record.       -Patient's radiology imaging tests from our last visit were reviewed     independently by me by direct visualization of the images.        -Patients current lab tests and medications were carefully reviewed to evaluate      patient's current treatment plan today.       -Patient was advised to call us right away if there are any new symptoms for an     urgent visit for further evaluation. Patient voiced understanding and agreed to     do so.      Elevated ferritin - R79.89            Notes      New Diagnostics      * CBC, As Soon As Possible       Dx: Hemochromatosis - E83.119      * Comp Metabolic Panel, Stat       Dx: Hemochromatosis - E83.119      * Iron Profile, Stat       Dx: Hemochromatosis - E83.119      * Serum Ferritin Level, As Soon As Possible       Dx: Hemochromatosis - E83.119      Time Spent:  > 50% /Coord Care      Patient Education Provided:  Yes            History and Present Illness      -August .  WBC 5.74.  Hemoglobin 11.2.  Platelet count 140,000.      Ferritin 1372.  Iron 79.  TIBC 302.      -January .  WBC 8.53.  Hemoglobin 10.9.  Platelet count is 67,000.      Ferritin 1268            This is a very pleasant 89-year-old female who presents to hematology clinic     for elevated ferritin.  Patient has received oral iron for the last 60 years.      Patient doesn't have any chest pain, abdominal pain, or any other symptoms.            Vitals      Height 5 ft 1.25 in / 155.58 cm      Weight 163 lbs 9.301 oz / 74.2 kg      BSA 1.82 m2      BMI 30.7 kg/m2      Temperature 99.4 F / 37.44 C - Temporal      Pulse 72      Blood Pressure 144/63 Sitting, Left Arm      Pulse Oximetry 99%, RM AIR            Allergies      Coded Allergies:             *No Known Allergies (Verified  Allergy, Unknown, 1/18/18)            Medications      Last Reconciled on 1/19/18 17:34 by POLLY MORIN MD      Solifenacin (Vesicare*) 5 Mg Tab      5 MG PO QDAY, TAB         Reported         8/22/17       Meloxicam (Meloxicam*) 7.5 Mg Tablet      7.5 MG PO QDAY, #30 TAB 0 Refills         Reported         8/22/17       Cetirizine Hcl (ZyrTEC*) 10 Mg Tablet      10 MG PO HS, #30 TAB 0 Refills         Reported          8/22/17       Calcium Carb/Vitamin D3 (Osteo-Poretical Tablet) 1 Ea Tab      1 EACH PO QDAY, TAB         Reported         8/22/17       Levothyroxine Sodium (Levoxyl*) 0.112 Mg Tablet      0.112 MG PO QDAY@07, #30 TAB 0 Refills         Reported         8/22/17       Triamterene/HCTZ 37.5/25 Mg (Triamterene/HCTZ 37.5/25 Mg) 1 Tab Tablet      37.5 MG PO QDAY, TAB         Reported         4/13/14       Omeprazole (Omeprazole*) 20 Mg Capcr      1 TAB PO QDAY, CAP         Reported         4/13/14       Triamterene (Dyrenium) 50 Mg Cap      50 MG PO QDAY, #1 CAP         Reported         4/13/14       Atenolol (Tenormin*) 25 Mg Tablet      25 MG PO QDAY         Reported         10/2/08            General Information      Referring Provider:  [facility]      Primary Provider:  [facility]            Pain and Fatigue Scales      Pain Assessment:           Experiencing any pain?:  No      Fatigue:           Experiencing any fatigue?:  No            Past Medical History      Yes Thyroid Disease      Hematology/oncology:  REPORTS HX OF: Other hematologic history (high ferritin),     DENIES HX OF: Previous Treatment for CA, Anemia, Bladder Cancer, Blood cancer,     Brain cancer, Breast cancer, Cervical cancer, Coagulopathy, Colorectal cancer,     Endocrine cancer, Eye cancer, GI cancer,  cancer, Kidney cancer, Leukemia,     Leukocytosis, Leukopenia, Liver cancer, Lung cancer, Lymphoma, Musculoskeletal     cancer, Myeloma, Neurologic cancer, Oral cancer, Ovarian cancer, Skin cancer,     Stomach cancer, Thrombocytopenia, Thyroid cancer, Uterine cancer, Other cancer     history      Genetic/metabolic:  DENIES HX OF: Cystic fibrosis, Down syndrome, Other genetic     history, Other metabolic history            Past Surgical History      REPORTS HX OF: Appendectomy, Cholecystectomy, Joint replacement, Hysterectomy (    partial), DENIES HX OF: Cataract extraction, Thyroid surgery, Lung biopsy, CABG     surgery,  Coronary stent, Valve replacement, Splenectomy, Bladder surgery,     Nephrectomy, Frature repair, Skin cancer removal, Melanoma excision, Spinal     surgery, Breast biopsy, Lumpectomy, Mastectomy, bilateral, Mastectomy, right,     Mastectomy, left, Peg Tube Placement, VAD Placement, Biopsy, Other Past     Surgical Hx            Family History      REPORTS HX OF: Kidney Cancer (son), DENIES HX OF: Anemia, Blood disorders,     Blood Cancer, Breast cancer, Cervical cancer, Coagulopathy, Colorectal cancer,     Endocrine Cancer, Eye Cancer, GI Cancer,  Cancer, Leukemia, Leukocytosis,     Leukopenia, Liver Cancer, Lung cancer, Lymphoma, Melanoma, Musculoskeletal     Cancer, Myeloma, Neurologic Cancer, Oral Cancer, Ovarian cancer, Prostate cancer    , Skin Cancer, Stomach Cancer, Testicular Cancer, Thrombocytopenia, Thyroid     cancer, Uterine cancer, Other Cancer History, Other Hematology History            Social History      Yes            Tobacco Use      Tobacco status:  Never smoker            Alcohol Use      Alcohol intake:  None            Substance Use      Substance use:  Denies use            ROS      General:  Denies: Appetite change, Excessive sweating, Fatigue, Fever, Night     sweats, Weight gain, Weight loss, Other      Eyes:  Denies: Blurred vision, Corrective lenses, Diplopia, Eye irritation, Eye     pain, Eye redness, Spots in vision, Vision loss, Other      Ears, nose, mouth, throat:  Denies: Headache, Seizures, Visual Changes, Hearing     loss, Sinus Congestion, Hoarseness, Sore throat, Other      Cardiovascular:  Denies: Chest pain, Irregular heartbeat, Palpitations, Swollen     ankles/legs, Other      Respiratory:  Denies: Chest pain, Shortness of Air, Productive cough, Coughing     blood, Other      Gastrointestinal:  Denies: Nausea, Vomiting, Problem swallowing, Frequent     heartburn, Constipation, Diarrhea, Tarry stools, Bloody stools, Unable to     control bowels, Other      Kidney/Bladder:   Denies: Painful Urination, Change in urinary stream, Blood in     urine, Incontinence, Frequent Urination, Decreased urine stream, Other      Musculoskeletal:  Denies: New Back pain, Leg Cramps, Painful Joints, Swollen     Joints, Muscle Pain, Muscle weakness, Other      Skin:  DENIES: Jaundice, Easy Bleeding, Lesions/changes in moles, Nail changes,     Skin Discoloration, Rash, Other      Neurological:  Denies: Dizziness, Fainting, Numbness\Tingling, Paralysis,     Seizures, Other      Psychiatric:  Complains of: AAO X 3, Denies: Anxiety, Panic attacks, Depression    , Memory loss, Other      Endocrine:  DiabetesThyroid DisorderOsteoporosisEndocrine Other      Hematologic/lymphatic:  Denies: Bruising, Bleeding, Enlarged Lymph Nodes,     Recurrent infections, Other      Reproductive:  Denies Pregnant, Denies Menopause, Denies Still Menstruating,     Denies Heavy Periods, Denies Other            General Appearance:  Alert, Oriented X3, No acute distress      Eyes:  Anicteric Sclerae, Moist Conjunctiva      HEENT:  Orophraynx clear, No Erythema      Neck:  Supple, Full ROM      Respiratory:  CTAB, No Diminished Breath      Abdomen\Gastro:  Soft, No Masses      Cardio:  RRR, No Murmur, No, Peripheral Edema      Skin:  Normal Temperature, Normal Tone, No Rash, lesions, ulcers      Psychiatric:  Appropriate Affect, AAO x3      Neuro:  Cranial Nerve II-XII Inta, No Focal Sensory Deficit      Muscularskeletal:  Normal Gait and Station, Full ROM of extremeties      Extremities:  No Digital Cyanosis, No Digital Ischemia      Lymphatic:  No Cervical, No Supraclavicular, No Axillary            Hx Influenza Vaccination:  Yes      Date Influenza Vaccine Given:  Oct 1, 2016      Influenza Vaccine Declined:  No      Hx Pneumococcal Vaccination:  Yes      Encouraged to follow-up with:  PCP regarding preventative exams.      Chart initiated by      Total time spent with patient was 25 minutes of which 15 minutes was spent      discussing the patient's diagnosis. During this time we had a face-to-face     counseling and coordination of care with the patient. We also discussed the     medical diagnosis and current treatment plan.  Patient was seen in clinic,     physical exam was performed, lab and imaging tests were noted, and above     medical documentation was also done by me.              Colton Sneed MD FACP      Board Certified Hematologist      Board Certified Medical Oncologist                 Disclaimer: Converted document may not contain table formatting or lab diagrams. Please see [employer] for the authenticated document.

## 2021-05-28 NOTE — PROGRESS NOTES
Patient: UMU CORTEZ     Acct: [id number]     Report: #[id number]  UNIT #: [id number]     : 1925    Encounter Date:2018  PRIMARY CARE: MARY ORLANDO  ***Signed***  --------------------------------------------------------------------------------------------------------------------  Chief Complaint      May 18, 2018      HEMOCHROMATOSIS            Current Plan      -Hyper-Ferritinemia      -Differential diagnosis includes iatrogenic overeating of iron sulfate,     hemochromatosis.  Patient has never received a blood transfusion.      -Hemochromatosis gene mutation was negative.      -We'll also check iron saturation and TIBC.  Will also check a transferrin.      -We'll also consider a liver ultrasound if the ferritin levels are increased to     evaluate for liver abnormalities.      -Orders placed for phlebotomy.  Doing well with treatment      -Patient has history of falls.  We'll give 1 L normal saline with phlebotomy            -Today's Orders and Plan      -Elevated ferritin.  Orders placed for 500 mL of phlebotomy      -Orders also placed for 1 L of normal saline      -Continue close followup            -Anemia      -Differential diagnoses includes anemia of chronic kidney disease and     nutritional deficiency.      -We'll check vitamin B12, folate, zinc, and copper.      -Continue close observation.  We will treat the elevated ferritin first.            -Clinic Evaluation      -Patient's imaging exams, blood tests, physicians' notes, and any new findings     since our last visit were reviewed today to reassess patient's medical     treatment plan.      -Old medical records were reviewed and summarized in chronological order in the     HPI today to maintain an updated medical record.       -Patient's radiology imaging tests from our last visit were reviewed     independently by me by direct visualization of the images.        -Patients current lab tests and medications were  carefully reviewed to evaluate     patient's current treatment plan today.       -Patient was advised to call us right away if there are any new symptoms for an     urgent visit for further evaluation. Patient voiced understanding and agreed to     do so.      Time Spent:  > 50% /Coord Care      Patient Education Provided:  Yes            History and Present Illness      This is a very pleasant 89-year-old female who presents to hematology clinic     for elevated ferritin.  Patient has received oral iron for the last 60 years.      Patient doesn't have any chest pain, abdominal pain, or any other symptoms.            -August .  WBC 5.74.  Hemoglobin 11.2.  Platelet count 140,000.      Ferritin 1372.  Iron 79.  TIBC 302.      -January .  WBC 8.53.  Hemoglobin 10.9.  Platelet count is 67,000.      Ferritin 1268            Vitals      Height 5 ft 1.25 in / 155.58 cm      Weight 168 lbs 13.958 oz / 76.6 kg      BSA 1.85 m2      BMI 31.6 kg/m2      Temperature 98.6 F / 37 C - Temporal      Pulse 74      Blood Pressure 139/60 Sitting, Left Arm      Pulse Oximetry 97%, ROOM AIR            Allergies      Coded Allergies:             *No Known Allergies (Verified  Allergy, Unknown, 5/18/18)            Medications      Last Reconciled on 5/24/18 22:39 by POLLY MORIN MD      Solifenacin (Vesicare*) 5 Mg Tab      5 MG PO QDAY, TAB         Reported         8/22/17       Meloxicam (Meloxicam*) 7.5 Mg Tablet      7.5 MG PO QDAY, #30 TAB 0 Refills         Reported         8/22/17       Cetirizine Hcl (ZyrTEC*) 10 Mg Tablet      10 MG PO HS, #30 TAB 0 Refills         Reported         8/22/17       Calcium Carb/Vitamin D3 (Osteo-Poretical Tablet) 1 Ea Tab      1 EACH PO QDAY, TAB         Reported         8/22/17       Levothyroxine Sodium (Levoxyl*) 0.112 Mg Tablet      0.112 MG PO QDAY@07, #30 TAB 0 Refills         Reported         8/22/17       Triamterene/HCTZ 37.5/25 Mg (Triamterene/HCTZ 37.5/25 Mg) 1  Tab Tablet      37.5 MG PO QDAY, TAB         Reported         4/13/14       Omeprazole (Omeprazole*) 20 Mg Capcr      1 TAB PO QDAY, CAP         Reported         4/13/14       Triamterene (Dyrenium) 50 Mg Cap      50 MG PO QDAY, #1 CAP         Reported         4/13/14       Atenolol (Tenormin*) 25 Mg Tablet      25 MG PO QDAY         Reported         10/2/08            General Information      Provider Not Found in Lookup:  MARY ORLANDO            Pain and Fatigue Scales      Fatigue:           Experiencing any fatigue?:  Yes         Fatigue (0-10 scale):  3            PAST, FAMILY   Past Medical History      Past Medical History:  No Diabetes Type 1, No Diabetes Type 2, Yes Thyroid     Disease, No COPD, No Emphysema, No Hypertension, No Stroke, No High Cholesterol    , No Heart Attack, No Bleeding Condition, No Low or High RBC Count, No Low or     High WBC Count, No Low or High Platelet Coun, No Hepatitis, No Kidney Disease,     No Depression, No Alzheimer's Disease, No Mental Disease, No Seizures, No     Arthritis, No Osteoporosis, No Osteopenia, No Short of Air, No Sleep apnea, No     Liver Disease, No STD, No Enlarged Prostate, No Other      Hematology/oncology:  REPORTS HX OF: Other hematologic history (high ferritin),     DENIES HX OF: Previous Treatment for CA, Anemia, Bladder Cancer, Blood cancer,     Brain cancer, Breast cancer, Cervical cancer, Coagulopathy, Colorectal cancer,     Endocrine cancer, Eye cancer, GI cancer,  cancer, Kidney cancer, Leukemia,     Leukocytosis, Leukopenia, Liver cancer, Lung cancer, Lymphoma, Musculoskeletal     cancer, Myeloma, Neurologic cancer, Oral cancer, Ovarian cancer, Skin cancer,     Stomach cancer, Thrombocytopenia, Thyroid cancer, Uterine cancer, Other cancer     history      Genetic/metabolic:  DENIES HX OF: Cystic fibrosis, Down syndrome, Other genetic     history, Other metabolic history            Past Surgical History      REPORTS HX OF: Appendectomy,  Cholecystectomy, Joint replacement, Hysterectomy (    partial), DENIES HX OF: Cataract extraction, Thyroid surgery, Lung biopsy, CABG     surgery, Coronary stent, Valve replacement, Splenectomy, Bladder surgery,     Nephrectomy, Frature repair, Skin cancer removal, Melanoma excision, Spinal     surgery, Breast biopsy, Lumpectomy, Mastectomy, bilateral, Mastectomy, right,     Mastectomy, left, Peg Tube Placement, VAD Placement, Biopsy, Other Past     Surgical Hx            Family History      REPORTS HX OF: Kidney Cancer (son), DENIES HX OF: Anemia, Blood disorders,     Blood Cancer, Breast cancer, Cervical cancer, Coagulopathy, Colorectal cancer,     Endocrine Cancer, Eye Cancer, GI Cancer,  Cancer, Leukemia, Leukocytosis,     Leukopenia, Liver Cancer, Lung cancer, Lymphoma, Melanoma, Musculoskeletal     Cancer, Myeloma, Neurologic Cancer, Oral Cancer, Ovarian cancer, Prostate cancer    , Skin Cancer, Stomach Cancer, Testicular Cancer, Thrombocytopenia, Thyroid     cancer, Uterine cancer, Other Cancer History, Other Hematology History            Social History      Lives independently:  Yes            Tobacco Use      Tobacco status:  Never smoker            Alcohol Use      Alcohol intake:  None            Substance Use      Substance use:  Denies use            REVIEW OF SYSTEMS      General:  Complains of: Fatigue, Denies: Appetite change, Excessive sweating,     Fever, Night sweats, Weight gain, Weight loss, Other      Eyes:  Denies: Blurred vision, Corrective lenses, Diplopia, Eye irritation, Eye     pain, Eye redness, Spots in vision, Vision loss, Other      Ears, nose, mouth, throat:  Denies: Headache, Seizures, Visual Changes, Hearing     loss, Sinus Congestion, Hoarseness, Sore throat, Other      Cardiovascular:  Denies: Chest pain, Irregular heartbeat, Palpitations, Swollen     ankles/legs, Other      Respiratory:  Denies: Chest pain, Shortness of Air, Productive cough, Coughing     blood, Other       Gastrointestinal:  Denies: Nausea, Vomiting, Problem swallowing, Frequent     heartburn, Constipation, Diarrhea, Tarry stools, Bloody stools, Unable to     control bowels, Other      Kidney/Bladder:  Denies: Painful Urination, Change in urinary stream, Blood in     urine, Incontinence, Frequent Urination, Decreased urine stream, Other      Musculoskeletal:  Denies: New Back pain, Leg Cramps, Painful Joints, Swollen     Joints, Muscle Pain, Muscle weakness, Other      Skin:  DENIES: Jaundice, Easy Bleeding, Lesions/changes in moles, Nail changes,     Skin Discoloration, Rash, Other      Neurological:  Denies: Dizziness, Fainting, Numbness\Tingling, Paralysis,     Seizures, Other      Psychiatric:  Complains of: AAO X 3, Denies: Anxiety, Panic attacks, Depression    , Memory loss, Other      Endocrine:  DiabetesThyroid DisorderOsteoporosisEndocrine Other      Hematologic/lymphatic:  Denies: Bruising, Bleeding, Enlarged Lymph Nodes,     Recurrent infections, Other      Reproductive:  Denies Pregnant, Denies Menopause, Denies Still Menstruating,     Denies Heavy Periods, Denies Other            General Appearance:  Alert, Oriented X3, No acute distress      Eyes:  Anicteric Sclerae, Moist Conjunctiva      HEENT:  Orophraynx clear, No Erythema      Neck:  Supple, Full ROM      Respiratory:  CTAB, No Diminished Breath      Abdomen\Gastro:  Soft, No Masses      Cardio:  RRR, No Murmur, No, Peripheral Edema      Skin:  Normal Temperature, Normal Tone, No Rash, lesions, ulcers      Psychiatric:  Appropriate Affect, AAO x3      Neuro:  Cranial Nerve II-XII Inta, No Focal Sensory Deficit      Muscularskeletal:  Normal Gait and Station, Full ROM of extremeties      Extremities:  No Digital Cyanosis, No Digital Ischemia      Lymphatic:  No Cervical, No Supraclavicular, No Axillary            Lab Results      Laboratory Tests      5/10/18 10:42            Laboratory Tests            Test        5/10/18      10:42              Magnesium Level        2.16 mg/dL      (1.60-2.30)            PREVENTION      Hx Influenza Vaccination:  Yes      Date Influenza Vaccine Given:  Oct 1, 2016      Influenza Vaccine Declined:  No      2 or More Falls Past Year?:  No      Fall Past Year with Injury?:  No      Hx Pneumococcal Vaccination:  Yes      Encouraged to follow-up with:  PCP regarding preventative exams.      Chart initiated by      BETO ORLANDO CMA                 Disclaimer: Converted document may not contain table formatting or lab diagrams. Please see [employer] System for the authenticated document.

## 2021-06-05 NOTE — H&P
History and Physical      Patient Name: Patsy Vidal   Patient ID: [id number]   Sex: Female   YOB: 1925    Referring Provider: Otis Jimenez MD    Visit Date: May 26, 2021    Provider: Joseph Greenfield MD   Location: [facility]   Location Address: [address] [place] [address]  [id number]   Location Phone: [phone]          Chief Complaint  · Bilateral Knee Osteoarthritis      History Of Present Illness  Patsy Vidal is a 89 year old /Black female who presents today to [facility].      Patient presents today for an evaluation of bilateral knees. Patient has a history of bilateral knee osteoarthritis that has been treating conservatively. She is requesting bilateral knee injections today. She currently resides in a nursing home. She presents today in a wheelchair for ambulation assistance.       Past Medical History  Anemia, Unspecified; Arthritis; Asthma; Bladder disorder; Bursitis of hip, right; Cancer; Congestive heart failure; Diabetes; Hypertension; Kidney Disease; Limb Swelling; Nocturia; Primary osteoarthritis of one knee, right; Primary osteoarthritis of right hip; Rectal bleeding; Reflux; Seasonal allergies; Shortness of Breath; Stomach Disorder; Stroke; Thyroid disorder; Urge Incontinence; Urgency of urination; Urinary Frequency; Urinary incontinence         Past Surgical History  Appendectomy; Artificial Joints/Limbs; Cholecstectomy; Colonoscopy; Eye Implant; heart surgery; Hemiarthroplasty of right hip; Hysterectomy; Joint Surgery; Partial hysterectomy         Medication List  atenolol 25 mg oral tablet; Calcium 600 600 mg calcium (1,500 mg) oral tablet; cetirizine 10 mg oral tablet; Dyrenium 50 mg oral capsule; meclizine 12.5 mg oral tablet; meloxicam 7.5 mg oral tablet; omeprazole 20 mg oral capsule,delayed release(DR/EC); Synthroid 100 mcg oral tablet; triamterene-hydrochlorothiazid 37.5-25 mg oral tablet; Vesicare 5 mg oral tablet  "        Allergy List  No known history of drug allergy       Allergies Reconciled  Family Medical History  - No Family History of Colorectal Cancer; Family history of certain chronic disabling diseases; arthritis         Social History  Alcohol Use (Never); Homemaker.; lives alone; Recreational Drug Use (Never); Retired.; Tobacco (Never);          Review of Systems  · Constitutional  o Denies  o : fever, chills, weight loss  · Cardiovascular  o Denies  o : chest pain, shortness of breath  · Gastrointestinal  o Denies  o : liver disease, heartburn, nausea, blood in stools  · Genitourinary  o Denies  o : painful urination, blood in urine  · Integument  o Denies  o : rash, itching  · Neurologic  o Denies  o : headache, weakness, loss of consciousness  · Musculoskeletal  o Denies  o : painful, swollen joints  · Psychiatric  o Denies  o : drug/alcohol addiction, anxiety, depression      Vitals  Date Time BP Position Site L\R Cuff Size HR RR TEMP (F) WT  HT  BMI kg/m2 BSA m2 O2 Sat FR L/min FiO2        05/26/2021 10:20 AM         145lbs 0oz 5'  3\" 25.69 1.71             Physical Examination  · Constitutional  o Appearance  o : well developed, well-nourished, no obvious deformities present  · Head and Face  o Head  o :   § Inspection  § : normocephalic  o Face  o :   § Inspection  § : no facial lesions  · Eyes  o Conjunctivae  o : conjunctivae normal  o Sclerae  o : sclerae white  · Ears, Nose, Mouth and Throat  o Ears  o :   § External Ears  § : appearance within normal limits  § Hearing  § : intact  o Nose  o :   § External Nose  § : appearance normal  · Neck  o Inspection/Palpation  o : normal appearance  o Range of Motion  o : full range of motion  · Respiratory  o Respiratory Effort  o : breathing unlabored  o Inspection of Chest  o : normal appearance  o Auscultation of Lungs  o : no audible wheezing or rales  · Cardiovascular  o Heart  o : regular rate  · Gastrointestinal  o Abdominal Examination  o : soft " and non-tender  · Skin and Subcutaneous Tissue  o General Inspection  o : intact, no rashes  · Psychiatric  o General  o : Alert and oriented x3  o Judgement and Insight  o : judgment and insight intact  o Mood and Affect  o : mood normal, affect appropriate  · Right Knee  o Inspection  o : Ambulation with a wheelchair. Decreased strength. Decreased limited range of motion. No swelling or skin discoloration. Calf supple, non-tender. Sensation grossly intact. Neurovascular intact. Skin intact. Limited range of motion of the knee. Arthritic in appearance.   · Left Knee  o Inspection  o : Ambulation with a wheelchair. Decreased strength. Decreased limited range of motion. No swelling or skin discoloration. Calf supple, non-tender. Sensation grossly intact. Neurovascular intact. Skin intact. Limited range of motion of the knee.   · Injection Note/Aspiration Note  o Site  o : bilateral knees   o Procedure  o : Procedure: After educating the patient, patient gave consent for procedure. After using Chloraprep, the joint space was injected. The patient tolerated the procedure well.   o Medication  o : 80 mg of DepoMedrol with 9cc of 1% Lidocaine          Assessment  · Primary osteoarthritis of right knee     715.16/M17.11  · Primary osteoarthritis of left knee     715.16/M17.12      Plan  · Orders  o Depo-Medrol injection 80mg () - 715.16/M17.11 - 05/26/2021   Lot [id number] Exp 11 2022 Pf[employer] Administered by CASSANDRA Greenfield MD  o Knee Intra-articular Injection without US Guidance [facility] ([id number]) - 715.16/M17.11 - 05/26/2021   Lot [id number] [id number] Exp 12 2022 [employer] Administered by CASSANDRA Greenfield MD  o Depo-Medrol injection 80mg () - 715.16/M17.12 - 05/26/2021   Lot [id number] Exp 11 2022 Pfizer Administered by CASSANDRA Greenfield MD  o Knee Intra-articular Injection without US Guidance [facility] ([id number]) - 715.16/M17.12 - 05/26/2021   Lot [id number] [id number] Exp 12 2022 [employer] Administered by CASSANDRA Greenfield MD  · Medications  o Medications have been Reconciled  o Transition of Care  or Provider Policy  · Instructions  o Dr. Greenfield saw and examined the patient and agrees with plan.   o Reviewed the patient's Past Medical, Social, and Family history as well as the ROS at today's visit, no changes.  o Call or return if worsening symptoms.  o Follow Up PRN.  o Discussed treatment plans and diagnosis with the patient. Patient received bilateral knee injections and tolerated this procedure well.   o The above service was scribed by Kimberly Rao on my behalf and I attest to the accuracy of the note. [email or web address]            Electronically Signed by: Kimberly Rao-, Other -Author on May 27, 2021 10:01:05 AM  Electronically Co-signed by: Joseph Greenfield MD -Reviewer on May 27, 2021 11:05:25 PM

## 2021-07-15 VITALS — WEIGHT: 145 LBS | BODY MASS INDEX: 25.69 KG/M2 | HEIGHT: 63 IN
